# Patient Record
Sex: MALE | Race: WHITE | NOT HISPANIC OR LATINO
[De-identification: names, ages, dates, MRNs, and addresses within clinical notes are randomized per-mention and may not be internally consistent; named-entity substitution may affect disease eponyms.]

---

## 2019-01-29 PROBLEM — Z00.00 ENCOUNTER FOR PREVENTIVE HEALTH EXAMINATION: Status: ACTIVE | Noted: 2019-01-29

## 2021-03-18 ENCOUNTER — APPOINTMENT (OUTPATIENT)
Dept: ORTHOPEDIC SURGERY | Facility: CLINIC | Age: 61
End: 2021-03-18
Payer: COMMERCIAL

## 2021-03-18 PROCEDURE — 76882 US LMTD JT/FCL EVL NVASC XTR: CPT | Mod: 59,LT

## 2021-03-18 PROCEDURE — 99204 OFFICE O/P NEW MOD 45 MIN: CPT | Mod: 25

## 2021-03-18 PROCEDURE — 99072 ADDL SUPL MATRL&STAF TM PHE: CPT

## 2021-03-18 NOTE — HISTORY OF PRESENT ILLNESS
[de-identified] : YMUIKO OWEN is a 60 year male presenting to the office complaining of bilateral knee pain. He  presents to the office ambulating with a wheelchair. Patient is currently a resident at [facility].  Patient reports pain pain began on 02/06/2021 after falling down the stairs. Patient reports tripping, falling down the stairs landing with both knees flexed underneath him. He went to [facility] where he had xrays and a CT scan reportedly negative for acute fracture/dislocation. Patient went to the rehabilitation facility due to inability to ambulate. Patient states he has minimal pain overall but is unable to extend either of his legs. Patient notes he has been working with physical therapist 7 days a week for the past 6 weeks noting minimal improvements in function.   Patient reports instability of the bilateral legs. Patient is taking Tylenol for pain relief with moderate relief in symptoms. Patient denies any other complaints at this time.  Patient has a pacemaker ICD place in ~ 2013.

## 2021-03-18 NOTE — PHYSICAL EXAM
[de-identified] : Right Lower Extremity\par o Knee :\par ¦ Inspection/Palpation : no tenderness to palpation, no swelling, palpable defect distal quadriceps tendon\par ¦ Range of Motion : PROM 0 - 120 degrees, no crepitus\par ¦ Stability : no valgus or varus instability present on provocative testing, Lachman’s Test (-)\par ¦ Strength : unable to actively extend the knee. \par o Muscle Bulk : normal muscle bulk present\par o Skin : no erythema, no ecchymosis\par o Sensation : sensation to pin intact\par o Vascular Exam : no edema, no cyanosis, dorsalis pedis artery pulse 2+, posterior tibial artery pulse 2+\par \par Left Lower Extremity\par o Knee :\par ¦ Inspection/Palpation : no tenderness to palpation, no swelling, large palpable defect distal quadriceps tendon\par ¦ Range of Motion : PROM 0 -120 degrees, no crepitus\par ¦ Stability : no valgus or varus instability present on provocative testing, Lachman’s Test (-)\par ¦ Strength : unable to actively extend the knee. \par o Muscle Bulk : normal muscle bulk present\par o Skin : no erythema, no ecchymosis\par o Sensation : sensation to pin intact\par o Vascular Exam : no edema, no cyanosis, dorsalis pedis artery pulse 2+, posterior tibial artery pulse 2+  [de-identified] : A partial diagnostic ultrasound of the bilateral quadriceps tendons was performed which was significant for a visible defect in bilateral distal  quadriceps tendons. \par \par \par Patient comes to today's visit with outside imaging already performed. I reviewed the images in detail with the patient and discussed the findings as highlighted below.\par \par o Bilateral knee Xrays taken on 03/16/2021 at [facility]:  \par ¦ moderate to advanced bilateral knee osteoarthritis \par ¦ bilateral patellar baja. \par

## 2021-03-18 NOTE — DISCUSSION/SUMMARY
[de-identified] : The underlying pathophysiology was reviewed in great detail with the patient as well as the various treatment options, including ice, analgesics, NSAIDs, Physical therapy, steroid injections, surgical intervention \par \par A partial diagnostic ultrasound of the bilateral quadriceps tendons was performed today.\par \par The patient wishes to proceed with SURGICAL INTERVENTION at this time. The risks and benefits of a BILATERAL DISTAL QUADRICEPS TENDON REPAIR were discussed in great detail today, including but not limited to bleeding, infection, nerve injury, DVT, allergy to the anesthetic, re-tear, persistent pain, stiffness, scarring, swelling or deformity.\par \par Pre-surgical testing (PST) laboratory tests were ordered today. \par \par Activity modifications and restrictions were discussed.\par \par All questions were answered, all alternatives discussed and the patient is in complete agreement with that plan. Follow-up appointment as instructed. Any issues and the patient will call or come in sooner. \par \par

## 2021-03-19 ENCOUNTER — APPOINTMENT (OUTPATIENT)
Dept: DISASTER EMERGENCY | Facility: CLINIC | Age: 61
End: 2021-03-19

## 2021-03-21 ENCOUNTER — TRANSCRIPTION ENCOUNTER (OUTPATIENT)
Age: 61
End: 2021-03-21

## 2021-03-22 ENCOUNTER — APPOINTMENT (OUTPATIENT)
Dept: ORTHOPEDIC SURGERY | Facility: HOSPITAL | Age: 61
End: 2021-03-22

## 2021-03-22 ENCOUNTER — INPATIENT (INPATIENT)
Facility: HOSPITAL | Age: 61
LOS: 10 days | Discharge: NURSING, REHAB OR LONG-TERM CARE | DRG: 500 | End: 2021-04-02
Attending: STUDENT IN AN ORGANIZED HEALTH CARE EDUCATION/TRAINING PROGRAM | Admitting: ORTHOPAEDIC SURGERY
Payer: COMMERCIAL

## 2021-03-22 VITALS
WEIGHT: 281.09 LBS | HEART RATE: 60 BPM | TEMPERATURE: 97 F | HEIGHT: 69 IN | SYSTOLIC BLOOD PRESSURE: 146 MMHG | RESPIRATION RATE: 17 BRPM | OXYGEN SATURATION: 96 % | DIASTOLIC BLOOD PRESSURE: 80 MMHG

## 2021-03-22 DIAGNOSIS — I48.0 PAROXYSMAL ATRIAL FIBRILLATION: ICD-10-CM

## 2021-03-22 DIAGNOSIS — S76.119S: ICD-10-CM

## 2021-03-22 DIAGNOSIS — I42.9 CARDIOMYOPATHY, UNSPECIFIED: ICD-10-CM

## 2021-03-22 DIAGNOSIS — I50.32 CHRONIC DIASTOLIC (CONGESTIVE) HEART FAILURE: ICD-10-CM

## 2021-03-22 DIAGNOSIS — Z95.810 PRESENCE OF AUTOMATIC (IMPLANTABLE) CARDIAC DEFIBRILLATOR: Chronic | ICD-10-CM

## 2021-03-22 DIAGNOSIS — Z90.49 ACQUIRED ABSENCE OF OTHER SPECIFIED PARTS OF DIGESTIVE TRACT: Chronic | ICD-10-CM

## 2021-03-22 DIAGNOSIS — I10 ESSENTIAL (PRIMARY) HYPERTENSION: ICD-10-CM

## 2021-03-22 DIAGNOSIS — N40.0 BENIGN PROSTATIC HYPERPLASIA WITHOUT LOWER URINARY TRACT SYMPTOMS: ICD-10-CM

## 2021-03-22 DIAGNOSIS — S76.119A STRAIN OF UNSPECIFIED QUADRICEPS MUSCLE, FASCIA AND TENDON, INITIAL ENCOUNTER: ICD-10-CM

## 2021-03-22 DIAGNOSIS — G47.33 OBSTRUCTIVE SLEEP APNEA (ADULT) (PEDIATRIC): ICD-10-CM

## 2021-03-22 DIAGNOSIS — Z95.5 PRESENCE OF CORONARY ANGIOPLASTY IMPLANT AND GRAFT: Chronic | ICD-10-CM

## 2021-03-22 DIAGNOSIS — I25.10 ATHEROSCLEROTIC HEART DISEASE OF NATIVE CORONARY ARTERY WITHOUT ANGINA PECTORIS: ICD-10-CM

## 2021-03-22 DIAGNOSIS — I82.409 ACUTE EMBOLISM AND THROMBOSIS OF UNSPECIFIED DEEP VEINS OF UNSPECIFIED LOWER EXTREMITY: Chronic | ICD-10-CM

## 2021-03-22 DIAGNOSIS — E66.01 MORBID (SEVERE) OBESITY DUE TO EXCESS CALORIES: ICD-10-CM

## 2021-03-22 LAB — SARS-COV-2 N GENE NPH QL NAA+PROBE: NOT DETECTED

## 2021-03-22 PROCEDURE — 99222 1ST HOSP IP/OBS MODERATE 55: CPT

## 2021-03-22 RX ORDER — HYDROMORPHONE HYDROCHLORIDE 2 MG/ML
0.5 INJECTION INTRAMUSCULAR; INTRAVENOUS; SUBCUTANEOUS
Refills: 0 | Status: DISCONTINUED | OUTPATIENT
Start: 2021-03-22 | End: 2021-03-29

## 2021-03-22 RX ORDER — HYDROMORPHONE HYDROCHLORIDE 2 MG/ML
1 INJECTION INTRAMUSCULAR; INTRAVENOUS; SUBCUTANEOUS
Refills: 0 | Status: DISCONTINUED | OUTPATIENT
Start: 2021-03-22 | End: 2021-03-22

## 2021-03-22 RX ORDER — SACUBITRIL AND VALSARTAN 24; 26 MG/1; MG/1
1 TABLET, FILM COATED ORAL
Refills: 0 | Status: DISCONTINUED | OUTPATIENT
Start: 2021-03-22 | End: 2021-04-02

## 2021-03-22 RX ORDER — ACETAMINOPHEN 500 MG
1000 TABLET ORAL ONCE
Refills: 0 | Status: COMPLETED | OUTPATIENT
Start: 2021-03-23 | End: 2021-03-23

## 2021-03-22 RX ORDER — ACETAMINOPHEN 500 MG
1000 TABLET ORAL ONCE
Refills: 0 | Status: COMPLETED | OUTPATIENT
Start: 2021-03-22 | End: 2021-03-22

## 2021-03-22 RX ORDER — AMIODARONE HYDROCHLORIDE 400 MG/1
200 TABLET ORAL DAILY
Refills: 0 | Status: DISCONTINUED | OUTPATIENT
Start: 2021-03-22 | End: 2021-04-02

## 2021-03-22 RX ORDER — OXYCODONE HYDROCHLORIDE 5 MG/1
5 TABLET ORAL
Refills: 0 | Status: DISCONTINUED | OUTPATIENT
Start: 2021-03-22 | End: 2021-03-22

## 2021-03-22 RX ORDER — APIXABAN 2.5 MG/1
5 TABLET, FILM COATED ORAL
Refills: 0 | Status: DISCONTINUED | OUTPATIENT
Start: 2021-03-24 | End: 2021-04-02

## 2021-03-22 RX ORDER — LANOLIN ALCOHOL/MO/W.PET/CERES
3 CREAM (GRAM) TOPICAL AT BEDTIME
Refills: 0 | Status: DISCONTINUED | OUTPATIENT
Start: 2021-03-22 | End: 2021-04-02

## 2021-03-22 RX ORDER — IPRATROPIUM/ALBUTEROL SULFATE 18-103MCG
3 AEROSOL WITH ADAPTER (GRAM) INHALATION EVERY 6 HOURS
Refills: 0 | Status: DISCONTINUED | OUTPATIENT
Start: 2021-03-22 | End: 2021-04-02

## 2021-03-22 RX ORDER — ACETAMINOPHEN 500 MG
975 TABLET ORAL EVERY 8 HOURS
Refills: 0 | Status: DISCONTINUED | OUTPATIENT
Start: 2021-03-24 | End: 2021-04-02

## 2021-03-22 RX ORDER — POLYETHYLENE GLYCOL 3350 17 G/17G
17 POWDER, FOR SOLUTION ORAL AT BEDTIME
Refills: 0 | Status: DISCONTINUED | OUTPATIENT
Start: 2021-03-22 | End: 2021-04-02

## 2021-03-22 RX ORDER — APIXABAN 2.5 MG/1
2.5 TABLET, FILM COATED ORAL EVERY 12 HOURS
Refills: 0 | Status: COMPLETED | OUTPATIENT
Start: 2021-03-23 | End: 2021-03-23

## 2021-03-22 RX ORDER — TAMSULOSIN HYDROCHLORIDE 0.4 MG/1
0.4 CAPSULE ORAL AT BEDTIME
Refills: 0 | Status: DISCONTINUED | OUTPATIENT
Start: 2021-03-22 | End: 2021-04-02

## 2021-03-22 RX ORDER — SODIUM CHLORIDE 9 MG/ML
1000 INJECTION, SOLUTION INTRAVENOUS
Refills: 0 | Status: DISCONTINUED | OUTPATIENT
Start: 2021-03-22 | End: 2021-03-22

## 2021-03-22 RX ORDER — SENNA PLUS 8.6 MG/1
2 TABLET ORAL AT BEDTIME
Refills: 0 | Status: DISCONTINUED | OUTPATIENT
Start: 2021-03-22 | End: 2021-04-02

## 2021-03-22 RX ORDER — IPRATROPIUM/ALBUTEROL SULFATE 18-103MCG
3 AEROSOL WITH ADAPTER (GRAM) INHALATION ONCE
Refills: 0 | Status: COMPLETED | OUTPATIENT
Start: 2021-03-22 | End: 2021-03-22

## 2021-03-22 RX ORDER — OXYBUTYNIN CHLORIDE 5 MG
10 TABLET ORAL DAILY
Refills: 0 | Status: DISCONTINUED | OUTPATIENT
Start: 2021-03-22 | End: 2021-04-02

## 2021-03-22 RX ORDER — ONDANSETRON 8 MG/1
4 TABLET, FILM COATED ORAL ONCE
Refills: 0 | Status: DISCONTINUED | OUTPATIENT
Start: 2021-03-22 | End: 2021-04-02

## 2021-03-22 RX ORDER — INFLUENZA VIRUS VACCINE 15; 15; 15; 15 UG/.5ML; UG/.5ML; UG/.5ML; UG/.5ML
0.5 SUSPENSION INTRAMUSCULAR ONCE
Refills: 0 | Status: DISCONTINUED | OUTPATIENT
Start: 2021-03-22 | End: 2021-04-02

## 2021-03-22 RX ORDER — KETOROLAC TROMETHAMINE 30 MG/ML
30 SYRINGE (ML) INJECTION ONCE
Refills: 0 | Status: DISCONTINUED | OUTPATIENT
Start: 2021-03-22 | End: 2021-03-22

## 2021-03-22 RX ORDER — CEFAZOLIN SODIUM 1 G
3000 VIAL (EA) INJECTION EVERY 8 HOURS
Refills: 0 | Status: COMPLETED | OUTPATIENT
Start: 2021-03-22 | End: 2021-03-23

## 2021-03-22 RX ORDER — FINASTERIDE 5 MG/1
5 TABLET, FILM COATED ORAL DAILY
Refills: 0 | Status: DISCONTINUED | OUTPATIENT
Start: 2021-03-22 | End: 2021-04-02

## 2021-03-22 RX ORDER — OXYCODONE HYDROCHLORIDE 5 MG/1
10 TABLET ORAL
Refills: 0 | Status: DISCONTINUED | OUTPATIENT
Start: 2021-03-22 | End: 2021-03-29

## 2021-03-22 RX ORDER — HYDROMORPHONE HYDROCHLORIDE 2 MG/ML
0.5 INJECTION INTRAMUSCULAR; INTRAVENOUS; SUBCUTANEOUS
Refills: 0 | Status: DISCONTINUED | OUTPATIENT
Start: 2021-03-22 | End: 2021-03-22

## 2021-03-22 RX ORDER — APIXABAN 2.5 MG/1
5 TABLET, FILM COATED ORAL EVERY 12 HOURS
Refills: 0 | Status: DISCONTINUED | OUTPATIENT
Start: 2021-03-22 | End: 2021-03-22

## 2021-03-22 RX ORDER — PANTOPRAZOLE SODIUM 20 MG/1
40 TABLET, DELAYED RELEASE ORAL
Refills: 0 | Status: DISCONTINUED | OUTPATIENT
Start: 2021-03-22 | End: 2021-04-02

## 2021-03-22 RX ORDER — ATORVASTATIN CALCIUM 80 MG/1
40 TABLET, FILM COATED ORAL AT BEDTIME
Refills: 0 | Status: DISCONTINUED | OUTPATIENT
Start: 2021-03-22 | End: 2021-04-02

## 2021-03-22 RX ORDER — CARVEDILOL PHOSPHATE 80 MG/1
25 CAPSULE, EXTENDED RELEASE ORAL
Refills: 0 | Status: DISCONTINUED | OUTPATIENT
Start: 2021-03-22 | End: 2021-04-02

## 2021-03-22 RX ORDER — ONDANSETRON 8 MG/1
4 TABLET, FILM COATED ORAL ONCE
Refills: 0 | Status: DISCONTINUED | OUTPATIENT
Start: 2021-03-22 | End: 2021-03-22

## 2021-03-22 RX ADMIN — HYDROMORPHONE HYDROCHLORIDE 0.5 MILLIGRAM(S): 2 INJECTION INTRAMUSCULAR; INTRAVENOUS; SUBCUTANEOUS at 19:56

## 2021-03-22 RX ADMIN — HYDROMORPHONE HYDROCHLORIDE 0.5 MILLIGRAM(S): 2 INJECTION INTRAMUSCULAR; INTRAVENOUS; SUBCUTANEOUS at 19:08

## 2021-03-22 RX ADMIN — POLYETHYLENE GLYCOL 3350 17 GRAM(S): 17 POWDER, FOR SOLUTION ORAL at 22:21

## 2021-03-22 RX ADMIN — HYDROMORPHONE HYDROCHLORIDE 0.5 MILLIGRAM(S): 2 INJECTION INTRAMUSCULAR; INTRAVENOUS; SUBCUTANEOUS at 22:19

## 2021-03-22 RX ADMIN — Medication 3 MILLILITER(S): at 20:18

## 2021-03-22 RX ADMIN — HYDROMORPHONE HYDROCHLORIDE 0.5 MILLIGRAM(S): 2 INJECTION INTRAMUSCULAR; INTRAVENOUS; SUBCUTANEOUS at 22:39

## 2021-03-22 RX ADMIN — Medication 200 MILLIGRAM(S): at 23:49

## 2021-03-22 RX ADMIN — ATORVASTATIN CALCIUM 40 MILLIGRAM(S): 80 TABLET, FILM COATED ORAL at 22:21

## 2021-03-22 RX ADMIN — Medication 3 MILLIGRAM(S): at 22:22

## 2021-03-22 RX ADMIN — HYDROMORPHONE HYDROCHLORIDE 0.5 MILLIGRAM(S): 2 INJECTION INTRAMUSCULAR; INTRAVENOUS; SUBCUTANEOUS at 19:06

## 2021-03-22 RX ADMIN — SENNA PLUS 2 TABLET(S): 8.6 TABLET ORAL at 22:21

## 2021-03-22 RX ADMIN — HYDROMORPHONE HYDROCHLORIDE 0.5 MILLIGRAM(S): 2 INJECTION INTRAMUSCULAR; INTRAVENOUS; SUBCUTANEOUS at 19:16

## 2021-03-22 RX ADMIN — TAMSULOSIN HYDROCHLORIDE 0.4 MILLIGRAM(S): 0.4 CAPSULE ORAL at 22:21

## 2021-03-22 RX ADMIN — HYDROMORPHONE HYDROCHLORIDE 0.5 MILLIGRAM(S): 2 INJECTION INTRAMUSCULAR; INTRAVENOUS; SUBCUTANEOUS at 18:58

## 2021-03-22 RX ADMIN — Medication 400 MILLIGRAM(S): at 23:49

## 2021-03-22 NOTE — CONSULT NOTE ADULT - ASSESSMENT
Mr Blankenship is a 60 year old morbidly obese male with  HTN, Afib on Eliquis, Cardiomyopathy (latest EF 53% - improved from 25%), s/p AICD, ERICK (cannot tolerate CPAP-noncompliant), HLD, BPH, arthritis admitted for repair of bilateral quadricep tendon rupture.  He was initially at [facility], s/p fall from 02/04/2021, no fractures noted so was d/margaret to rehab facility.  However, even after more than 2 weeks, gait has not improved, still unable to ambulate.  He was seen by ortho, further testing, found to have bilateral quadriceps rupture, he underwent repair this afternoon.  Post op, had episode of wheezing, relieved with Duoneb.   s/p repair of quadricep tendon rupture, bilat  post op bronchospasm, resolved, does not appear to be in CHF  Multiple chronic medical problems as above, but currently stable    Continue O2 supp - switch to nasal cannula 4L for now, while asleep - pt is intolerant of CPAP  Cont current meds: Amiodarone, Carvedilol, Entresto, Eliquis, Atorvastatin, Finasteride, Flomax, Oxybutinin  Duoneb prn, Analgesics prn   Incentive spirometry Mr Blankenship is a 60 year old morbidly obese male with  HTN, Afib on Eliquis, Cardiomyopathy (latest EF 53% - improved from 25%), s/p AICD, ERICK (cannot tolerate CPAP-noncompliant), HLD, BPH, arthritis admitted for repair of bilateral quadricep tendon rupture.  He was initially at [facility], s/p fall from 02/04/2021, no fractures noted so was d/margaret to rehab facility.  However, even after more than 2 weeks, gait has not improved, still unable to ambulate.  He was seen by ortho, further testing, found to have bilateral quadriceps rupture, he underwent repair this afternoon.  Post op, had episode of wheezing, relieved with Duoneb.   s/p repair of quadricep tendon rupture, bilat  post op bronchospasm, resolved, does not appear to be in CHF  Multiple chronic medical problems as above, but currently stable    Continue O2 supp - switch to nasal cannula 4L for now, while asleep - pt is intolerant of CPAP  Cont current meds: Amiodarone, Carvedilol, Entresto, Eliquis, Atorvastatin, Finasteride, Flomax, Oxybutinin  Duoneb prn, Analgesics prn   Incentive spirometry  PT  ffup labs    IMPROVE VTE Individual Risk Assessment  RISK                                                                Points  [x  ] Previous VTE                                                  3  [  ] Thrombophilia                                               2  [  ] Lower limb paralysis                                      2        (unable to hold up >15 seconds)    [  ] Current Cancer                                              2         (within 6 months)  [ x ] Immobilization > 24 hrs                                1  [  ] ICU/CCU stay > 24 hours                              1  [x  ] Age > 60                                                      1  IMPROVE VTE Score _5_  IMPROVE Score 0-1: Low Risk, No VTE prophylaxis required for most patients, encourage ambulation.   IMPROVE Score 2-3: At risk, pharmacologic VTE prophylaxis is indicated for most patients (in the absence of a contraindication)  *Pt is already on Eliquis  IMPROVE Score > or = 4: High Risk, pharmacologic VTE prophylaxis is indicated for most patients (in the absence of a contraindication) Mr Blankenship is a 60 year old morbidly obese male with  HTN, Afib on Eliquis, Cardiomyopathy (latest EF 53% - improved from 25%), s/p AICD, ERICK (cannot tolerate CPAP-noncompliant), HLD, BPH, arthritis admitted for repair of bilateral quadricep tendon rupture.  He was initially at [facility], s/p fall from 02/04/2021, no fractures noted so was d/margaret to rehab facility.  However, even after more than 2 weeks, gait has not improved, still unable to ambulate.  He was seen by ortho, further testing, found to have bilateral quadriceps rupture, he underwent repair this afternoon.  Post op, had episode of wheezing, relieved with Duoneb.   s/p repair of quadricep tendon rupture, bilat  post op bronchospasm, resolved, does not appear to be in CHF  Multiple chronic medical problems as above, but currently stable    Continue O2 supp - switch to nasal cannula 4L for now, while asleep - pt is intolerant of CPAP  Cont current meds: Amiodarone, Carvedilol, Entresto, Eliquis, Atorvastatin, Finasteride, Flomax, Oxybutinin  Duoneb prn, Analgesics prn   Incentive spirometry  PT  ffup labs    IMPROVE VTE Individual Risk Assessment  RISK                                                                Points  [x  ] Previous VTE                                                  3  [  ] Thrombophilia                                               2  [  ] Lower limb paralysis                                      2        (unable to hold up >15 seconds)    [  ] Current Cancer                                              2         (within 6 months)  [ x ] Immobilization > 24 hrs                                1  [  ] ICU/CCU stay > 24 hours                              1  [x  ] Age > 60                                                      1  IMPROVE VTE Score _5_  IMPROVE Score 0-1: Low Risk, No VTE prophylaxis required for most patients, encourage ambulation.   IMPROVE Score 2-3: At risk, pharmacologic VTE prophylaxis is indicated for most patients (in the absence of a contraindication)  IMPROVE Score > or = 4: High Risk, pharmacologic VTE prophylaxis is indicated for most patients (in the absence of a contraindication)  *Pt is already on Eliquis

## 2021-03-22 NOTE — BRIEF OPERATIVE NOTE - NSICDXBRIEFPREOP_GEN_ALL_CORE_FT
PRE-OP DIAGNOSIS:  Rupture of quadriceps tendon, unspecified laterality, sequela 22-Mar-2021 19:09:47 Bilateral Oscar Burger

## 2021-03-22 NOTE — BRIEF OPERATIVE NOTE - NSICDXBRIEFPOSTOP_GEN_ALL_CORE_FT
POST-OP DIAGNOSIS:  Traumatic rupture of quadriceps tendon, sequela 22-Mar-2021 19:10:28 Bilateral Oscar Burger

## 2021-03-22 NOTE — ASU PATIENT PROFILE, ADULT - PMH
BPH (benign prostatic hyperplasia)    CAD (coronary artery disease)    Cardiomyopathy    Chronic diastolic congestive heart failure    Effusion of both knee joints    HLD (hyperlipidemia)    HTN (hypertension)    Morbid obesity    ERICK (obstructive sleep apnea)    Paroxysmal A-fib    Syncope

## 2021-03-22 NOTE — CONSULT NOTE ADULT - SUBJECTIVE AND OBJECTIVE BOX
Patient gives hx of falling down the stairs on Feb 4.   He was taken  to [facility] for evaluation ,    He was sent home but patient unable to    ambulate .   He was admitted to a rehab facility for therapy.   The patient was not improving with therapy and was sent for orthopedic evaluation.  He underwent testing and was found to have bilateral quadriceps rupture.   Patient scheduled for bilateral repair this afternoon and will then return   to rehab center.               Mr Blankenship is a 60 year old morbidly obese male with  HTN, Afib on Eliquis, Cardiomyopathy (latest EF 53% - improved from 25%), s/p AICD, ERICK (cannot tolerate CPAP-noncompliant), HLD, BPH, arthritis admitted for repair of bilateral quadricep tendon rupture.  He was initially at [facility], s/p fall from 02/04/2021, no fractures noted so was d/margaret to rehab facility.  However, even after more than 2 weeks, gait has not improved, still unable to ambulate.  He was seen by ortho, further testing, found to have bilateral quadriceps rupture, he underwent repair this afternoon.    Post op, pt with episode of wheezing, received Duoneb x 1, with relief.  He denies chest pain, cough, dyspnea.  He is responsive but drowsy.  Pt states he is unable to tolerate CPAP, even with a face or nasal mask.  He is a non smoker.                Mr Blankenship is a 60 year old morbidly obese male with  HTN, Afib on Eliquis, Cardiomyopathy (latest EF 53% - improved from 25%), s/p AICD, ERICK (cannot tolerate CPAP-noncompliant), HLD, BPH, arthritis admitted for repair of bilateral quadricep tendon rupture.  He was initially at [facility], s/p fall from 02/04/2021, no fractures noted so was d/margaret to rehab facility.  However, even after more than 2 weeks, gait has not improved, still unable to ambulate.  He was seen by ortho, further testing, found to have bilateral quadriceps rupture, he underwent repair this afternoon.    Post op, pt with episode of wheezing, received Duoneb x 1, with relief.  He was placed on a face mask 30% and O2 sat is 100%.  He denies chest pain, cough, dyspnea.  He is responsive but drowsy.  Pt states he is unable to tolerate CPAP, even with a face or nasal mask.  He is a non smoker, +occ/social etoh.    Home Medications:  amiodarone 200 mg oral tablet: 1 tab(s) orally once a day (22 Mar 2021 20:51)  atorvastatin 40 mg oral tablet: 1 tab(s) orally once a day (22 Mar 2021 20:51)  carvedilol 25 mg oral tablet: 1 tab(s) orally 2 times a day (22 Mar 2021 20:51)  Eliquis 5 mg oral tablet: 1 tab(s) orally 2 times a day (22 Mar 2021 20:51)  Entresto 49 mg-51 mg oral tablet: 1 tab(s) orally 2 times a day (22 Mar 2021 20:51)  finasteride 5 mg oral tablet: 1 tab(s) orally once a day (22 Mar 2021 20:51)  Flomax 0.4 mg oral capsule: 1 cap(s) orally once a day (22 Mar 2021 20:51)  Melatonin 3 mg oral tablet: 1 tab(s) orally once a day (at bedtime) (22 Mar 2021 20:51)  oxybutynin 10 mg/24 hr oral tablet, extended release: 1 tab(s) orally once a day (22 Mar 2021 20:51)  traMADol 50 mg oral tablet: orally every 6 hours, As Needed (22 Mar 2021 20:51)

## 2021-03-22 NOTE — ASU PREOP CHECKLIST - HOW ADMINISTERED
carvedilol/Self Administrated Complex Repair And Flap Additional Text (Will Appearing After The Standard Complex Repair Text): The complex repair was not sufficient to completely close the primary defect. The remaining additional defect was repaired with the flap mentioned below.

## 2021-03-22 NOTE — H&P ADULT - NSICDXPASTSURGICALHX_GEN_ALL_CORE_FT
PAST SURGICAL HISTORY:  Acute DVT (deep venous thrombosis)     Biventricular ICD (implantable cardioverter-defibrillator) in place     Coronary stent patent     History of appendectomy

## 2021-03-22 NOTE — H&P ADULT - NSHPPHYSICALEXAM_GEN_ALL_CORE
Gen:  obese gentleman , Alert and oriented X 3   Good hygiene  Neck:  No JVD   Lungs:  CTA   Cor:  S1S2  no murmur appreciated   Abd:  soft, obese, +BS   Ext:  + Bilateral quadriceps rupture Right > Left           Decreased ROM - patient unable to lift legs           calves soft, good pulses

## 2021-03-22 NOTE — PATIENT PROFILE ADULT - VISION (WITH CORRECTIVE LENSES IF THE PATIENT USUALLY WEARS THEM):
Eyeglasses/Partially impaired: cannot see medication labels or newsprint, but can see obstacles in path, and the surrounding layout; can count fingers at arm's length

## 2021-03-22 NOTE — PATIENT PROFILE ADULT - NSTRANSFEREYEGLASSESPAIRS_GEN_A_NUR
Per patient, "Still in a clear bag in pre-operative room." Will f/u in AM to retrieve eyeglasses./1 pair

## 2021-03-22 NOTE — H&P ADULT - HISTORY OF PRESENT ILLNESS
Patient gives hx of falling down the stairs on Feb 4.   He was taken  to [facility] for evaluation ,    He was sent home but patient unable to    ambulate .   He was admitted to a rehab facility for therapy.   The patient was not improving with therapy and was sent for orthopedic evaluation.  He underwent testing and was found to have bilateral quadriceps rupture.   Patient scheduled for bilateral repair this afternoon and will then return   to rehab center.

## 2021-03-22 NOTE — H&P ADULT - ASSESSMENT
60 year old male obese, ERICK, cardiomyapathy, , CAD., CHF, Cardiac stents, afib, htn, hld, bph presents for repair of bilateral quadriceps rupture .

## 2021-03-22 NOTE — H&P ADULT - NSICDXPASTMEDICALHX_GEN_ALL_CORE_FT
PAST MEDICAL HISTORY:  BPH (benign prostatic hyperplasia)     CAD (coronary artery disease)     Cardiomyopathy     Chronic diastolic congestive heart failure     Effusion of both knee joints     HLD (hyperlipidemia)     HTN (hypertension)     Morbid obesity     ERICK (obstructive sleep apnea)     Paroxysmal A-fib     Syncope

## 2021-03-22 NOTE — ASU PATIENT PROFILE, ADULT - PSH
Biventricular ICD (implantable cardioverter-defibrillator) in place    Coronary stent patent     Acute DVT (deep venous thrombosis)    Biventricular ICD (implantable cardioverter-defibrillator) in place    Coronary stent patent    History of appendectomy

## 2021-03-22 NOTE — H&P ADULT - NSHPREVIEWOFSYSTEMS_GEN_ALL_CORE
ERICK  hx of CHF  paroxysmal Atrial Fibrillation   htn  bph  CAD with 6 coronary  stents  (pacemaker /difibrillator )  cardiomyopathy   mild arthritis   bilateral ruptured quadriceps

## 2021-03-23 LAB
ANION GAP SERPL CALC-SCNC: 11 MMOL/L — SIGNIFICANT CHANGE UP (ref 5–17)
BUN SERPL-MCNC: 20 MG/DL — SIGNIFICANT CHANGE UP (ref 7–23)
CALCIUM SERPL-MCNC: 9.2 MG/DL — SIGNIFICANT CHANGE UP (ref 8.4–10.5)
CHLORIDE SERPL-SCNC: 105 MMOL/L — SIGNIFICANT CHANGE UP (ref 96–108)
CO2 SERPL-SCNC: 25 MMOL/L — SIGNIFICANT CHANGE UP (ref 22–31)
COVID-19 SPIKE DOMAIN AB INTERP: NEGATIVE — SIGNIFICANT CHANGE UP
COVID-19 SPIKE DOMAIN ANTIBODY RESULT: 0.4 U/ML — SIGNIFICANT CHANGE UP
CREAT SERPL-MCNC: 1.26 MG/DL — SIGNIFICANT CHANGE UP (ref 0.5–1.3)
GLUCOSE SERPL-MCNC: 155 MG/DL — HIGH (ref 70–99)
HCT VFR BLD CALC: 42 % — SIGNIFICANT CHANGE UP (ref 39–50)
HCV AB S/CO SERPL IA: 0.16 S/CO — SIGNIFICANT CHANGE UP (ref 0–0.99)
HCV AB SERPL-IMP: SIGNIFICANT CHANGE UP
HGB BLD-MCNC: 13.9 G/DL — SIGNIFICANT CHANGE UP (ref 13–17)
MCHC RBC-ENTMCNC: 32.3 PG — SIGNIFICANT CHANGE UP (ref 27–34)
MCHC RBC-ENTMCNC: 33.1 GM/DL — SIGNIFICANT CHANGE UP (ref 32–36)
MCV RBC AUTO: 97.7 FL — SIGNIFICANT CHANGE UP (ref 80–100)
NRBC # BLD: 0 /100 WBCS — SIGNIFICANT CHANGE UP (ref 0–0)
NT-PROBNP SERPL-SCNC: 437 PG/ML — HIGH (ref 0–300)
PLATELET # BLD AUTO: 241 K/UL — SIGNIFICANT CHANGE UP (ref 150–400)
POTASSIUM SERPL-MCNC: 4 MMOL/L — SIGNIFICANT CHANGE UP (ref 3.5–5.3)
POTASSIUM SERPL-SCNC: 4 MMOL/L — SIGNIFICANT CHANGE UP (ref 3.5–5.3)
RBC # BLD: 4.3 M/UL — SIGNIFICANT CHANGE UP (ref 4.2–5.8)
RBC # FLD: 13.8 % — SIGNIFICANT CHANGE UP (ref 10.3–14.5)
SARS-COV-2 IGG+IGM SERPL QL IA: 0.4 U/ML — SIGNIFICANT CHANGE UP
SARS-COV-2 IGG+IGM SERPL QL IA: NEGATIVE — SIGNIFICANT CHANGE UP
SODIUM SERPL-SCNC: 141 MMOL/L — SIGNIFICANT CHANGE UP (ref 135–145)
WBC # BLD: 11.59 K/UL — HIGH (ref 3.8–10.5)
WBC # FLD AUTO: 11.59 K/UL — HIGH (ref 3.8–10.5)

## 2021-03-23 PROCEDURE — 99233 SBSQ HOSP IP/OBS HIGH 50: CPT

## 2021-03-23 PROCEDURE — 99222 1ST HOSP IP/OBS MODERATE 55: CPT

## 2021-03-23 PROCEDURE — 71045 X-RAY EXAM CHEST 1 VIEW: CPT | Mod: 26

## 2021-03-23 RX ADMIN — Medication 3 MILLIGRAM(S): at 21:24

## 2021-03-23 RX ADMIN — Medication 1000 MILLIGRAM(S): at 00:14

## 2021-03-23 RX ADMIN — HYDROMORPHONE HYDROCHLORIDE 0.5 MILLIGRAM(S): 2 INJECTION INTRAMUSCULAR; INTRAVENOUS; SUBCUTANEOUS at 05:15

## 2021-03-23 RX ADMIN — SACUBITRIL AND VALSARTAN 1 TABLET(S): 24; 26 TABLET, FILM COATED ORAL at 17:15

## 2021-03-23 RX ADMIN — CARVEDILOL PHOSPHATE 25 MILLIGRAM(S): 80 CAPSULE, EXTENDED RELEASE ORAL at 17:16

## 2021-03-23 RX ADMIN — CARVEDILOL PHOSPHATE 25 MILLIGRAM(S): 80 CAPSULE, EXTENDED RELEASE ORAL at 05:20

## 2021-03-23 RX ADMIN — Medication 1000 MILLIGRAM(S): at 08:01

## 2021-03-23 RX ADMIN — HYDROMORPHONE HYDROCHLORIDE 0.5 MILLIGRAM(S): 2 INJECTION INTRAMUSCULAR; INTRAVENOUS; SUBCUTANEOUS at 05:25

## 2021-03-23 RX ADMIN — Medication 200 MILLIGRAM(S): at 08:51

## 2021-03-23 RX ADMIN — APIXABAN 2.5 MILLIGRAM(S): 2.5 TABLET, FILM COATED ORAL at 09:39

## 2021-03-23 RX ADMIN — OXYCODONE HYDROCHLORIDE 10 MILLIGRAM(S): 5 TABLET ORAL at 14:59

## 2021-03-23 RX ADMIN — FINASTERIDE 5 MILLIGRAM(S): 5 TABLET, FILM COATED ORAL at 12:22

## 2021-03-23 RX ADMIN — TAMSULOSIN HYDROCHLORIDE 0.4 MILLIGRAM(S): 0.4 CAPSULE ORAL at 21:24

## 2021-03-23 RX ADMIN — Medication 400 MILLIGRAM(S): at 16:47

## 2021-03-23 RX ADMIN — SACUBITRIL AND VALSARTAN 1 TABLET(S): 24; 26 TABLET, FILM COATED ORAL at 05:20

## 2021-03-23 RX ADMIN — OXYCODONE HYDROCHLORIDE 10 MILLIGRAM(S): 5 TABLET ORAL at 13:59

## 2021-03-23 RX ADMIN — HYDROMORPHONE HYDROCHLORIDE 0.5 MILLIGRAM(S): 2 INJECTION INTRAMUSCULAR; INTRAVENOUS; SUBCUTANEOUS at 23:00

## 2021-03-23 RX ADMIN — Medication 1000 MILLIGRAM(S): at 17:02

## 2021-03-23 RX ADMIN — Medication 10 MILLIGRAM(S): at 12:22

## 2021-03-23 RX ADMIN — HYDROMORPHONE HYDROCHLORIDE 0.5 MILLIGRAM(S): 2 INJECTION INTRAMUSCULAR; INTRAVENOUS; SUBCUTANEOUS at 22:37

## 2021-03-23 RX ADMIN — PANTOPRAZOLE SODIUM 40 MILLIGRAM(S): 20 TABLET, DELAYED RELEASE ORAL at 05:21

## 2021-03-23 RX ADMIN — CARVEDILOL PHOSPHATE 25 MILLIGRAM(S): 80 CAPSULE, EXTENDED RELEASE ORAL at 01:13

## 2021-03-23 RX ADMIN — OXYCODONE HYDROCHLORIDE 10 MILLIGRAM(S): 5 TABLET ORAL at 09:39

## 2021-03-23 RX ADMIN — ATORVASTATIN CALCIUM 40 MILLIGRAM(S): 80 TABLET, FILM COATED ORAL at 21:24

## 2021-03-23 RX ADMIN — OXYCODONE HYDROCHLORIDE 10 MILLIGRAM(S): 5 TABLET ORAL at 10:39

## 2021-03-23 RX ADMIN — Medication 400 MILLIGRAM(S): at 07:46

## 2021-03-23 RX ADMIN — APIXABAN 2.5 MILLIGRAM(S): 2.5 TABLET, FILM COATED ORAL at 21:24

## 2021-03-23 RX ADMIN — AMIODARONE HYDROCHLORIDE 200 MILLIGRAM(S): 400 TABLET ORAL at 05:20

## 2021-03-23 NOTE — CONSULT NOTE ADULT - SUBJECTIVE AND OBJECTIVE BOX
Chief Complaint:   Patient gives hx of falling down the stairs on Feb 4.   He was taken  to [facility] for evaluation ,    He was sent home but patient unable to    ambulate .   He was admitted to a rehab facility for therapy.   The patient was not improving with therapy and was sent for orthopedic evaluation.  He underwent testing and was found to have bilateral quadriceps rupture.   Patient scheduled for bilateral repair this afternoon and will then return to rehab center. we are asked to evaluate anticoagulation status.  patient of dr bahena.  patt.     HPI:    PMH:   Chronic diastolic congestive heart failure    BPH (benign prostatic hyperplasia)    Morbid obesity    HLD (hyperlipidemia)    Effusion of both knee joints    HTN (hypertension)    ERICK (obstructive sleep apnea)    Syncope    Cardiomyopathy    Paroxysmal A-fib    CAD (coronary artery disease)      PSH:   History of appendectomy    Acute DVT (deep venous thrombosis)    Biventricular ICD (implantable cardioverter-defibrillator) in place    Coronary stent patent      Family History:  FAMILY HISTORY:      Social History:  Smoking:  Alcohol:  Drugs:    Allergies:  No Known Allergies      Medications:  acetaminophen  IVPB .. 1000 milliGRAM(s) IV Intermittent once  albuterol/ipratropium for Nebulization 3 milliLiter(s) Nebulizer every 6 hours PRN  aMIOdarone    Tablet 200 milliGRAM(s) Oral daily  apixaban 2.5 milliGRAM(s) Oral every 12 hours  atorvastatin 40 milliGRAM(s) Oral at bedtime  bisacodyl Suppository 10 milliGRAM(s) Rectal once PRN  carvedilol 25 milliGRAM(s) Oral two times a day  finasteride 5 milliGRAM(s) Oral daily  HYDROmorphone  Injectable 0.5 milliGRAM(s) IV Push every 3 hours PRN  influenza   Vaccine 0.5 milliLiter(s) IntraMuscular once  ketorolac   Injectable 30 milliGRAM(s) IV Push once PRN  melatonin 3 milliGRAM(s) Oral at bedtime  ondansetron Injectable 4 milliGRAM(s) IV Push once  oxybutynin 10 milliGRAM(s) Oral daily  oxyCODONE    IR 10 milliGRAM(s) Oral every 3 hours PRN  oxyCODONE    IR 5 milliGRAM(s) Oral every 3 hours PRN  pantoprazole    Tablet 40 milliGRAM(s) Oral before breakfast  polyethylene glycol 3350 17 Gram(s) Oral at bedtime  sacubitril 49 mG/valsartan 51 mG 1 Tablet(s) Oral two times a day  senna 2 Tablet(s) Oral at bedtime  tamsulosin 0.4 milliGRAM(s) Oral at bedtime      REVIEW OF SYSTEMS:  CONSTITUTIONAL: No fever, weight loss, or fatigue  EYES: No eye pain, visual disturbances, or discharge  ENMT:  No difficulty hearing, tinnitus, vertigo; No sinus or throat pain  NECK: No pain or stiffness  BREASTS: No pain, masses, or nipple discharge  RESPIRATORY: No cough, wheezing, chills or hemoptysis; No shortness of breath  CARDIOVASCULAR: No chest pain, palpitations, dizziness, or leg swelling  GASTROINTESTINAL: No abdominal or epigastric pain. No nausea, vomiting, or hematemesis; No diarrhea or constipation. No melena or hematochezia.  GENITOURINARY: No dysuria, frequency, hematuria, or incontinence  NEUROLOGICAL: No headaches, memory loss, loss of strength, numbness, or tremors  SKIN: No itching, burning, rashes, or lesions   LYMPH NODES: No enlarged glands  ENDOCRINE: No heat or cold intolerance; No hair loss  MUSCULOSKELETAL: No joint pain or swelling; No muscle, back, or extremity pain  PSYCHIATRIC: No depression, anxiety, mood swings, or difficulty sleeping  HEME/LYMPH: No easy bruising, or bleeding gums  ALLERY AND IMMUNOLOGIC: No hives or eczema    Physical Exam:  T(C): 36.8 (03-23-21 @ 08:06), Max: 37.1 (03-22-21 @ 20:54)  HR: 77 (03-23-21 @ 08:06) (70 - 88)  BP: 130/72 (03-23-21 @ 08:06) (130/72 - 186/87)  RR: 14 (03-23-21 @ 08:06) (14 - 18)  SpO2: 94% (03-23-21 @ 08:06) (94% - 99%)  Wt(kg): --    GENERAL: NAD, well-groomed, well-developed overweight  HEAD:  Atraumatic, Normocephalic  EYES: EOMI, conjunctiva and sclera clear  ENT: Moist mucous membranes,  NECK: Supple, No JVD, no bruits  CHEST/LUNG: Clear to percussion bilaterally; No rales, rhonchi, wheezing, or rubs  HEART: Regular rate and rhythm; No murmurs, rubs, or gallops PMI non displaced.  ABDOMEN: Soft, Nontender, Nondistended; Bowel sounds present  EXTREMITIES:  2+ Peripheral Pulses, No clubbing, cyanosis, or edema  SKIN: No rashes or lesions  NERVOUS SYSTEM:  Cranial Nerves II-XII intact     Cardiovascular Diagnostic Testing:  ECG:    evae cam      ECHO:        Labs:                        13.9   11.59 )-----------( 241      ( 23 Mar 2021 06:00 )             42.0     03-23    141  |  105  |  20  ----------------------------<  155<H>  4.0   |  25  |  1.26    Ca    9.2      23 Mar 2021 06:00      Serum Pro-Brain Natriuretic Peptide: 437 pg/mL (03-23 @ 06:00)      Imaging:    < from: Xray Chest 1 View- PORTABLE-Routine (Xray Chest 1 View- PORTABLE-Routine in AM.) (03.23.21 @ 09:47) >  ETATION:  Portable chest x-ray    Clinical Indication: History of cardiomyopathy. Rule out CHF    Comparison: None    Portable chest x-ray is acquired for evaluation.    Impression: No evidence for acute pulmonary infiltrate, pleural effusion, or pneumothorax.    The trachea is midline.    The cardiac silhouette is within normal limits.    No pulmonary vascular congestion.    Left cardiac device is present. Another electronic device projecting over the left lung base.        JESU BUTT MD; Attending Radiologist  This document has been electronically signed. Mar 23 2021 10:33AM    < end of copied text >              discussion    ischemic cardiomyopathy /chronic diastolic CHF/prior cardiac stents  review [facility] records dr bahena/  previously on asa 81 mg according to patient report.      morbid obesity/ history of Afib with peripheral  embolization  previously on Eliquis 5 bid according to patient report

## 2021-03-23 NOTE — OCCUPATIONAL THERAPY INITIAL EVALUATION ADULT - ADDITIONAL COMMENTS
As per pt, prior to 2/21 pt was independent in all ADLs/transfers however, required increased time and effort due to leg pain and poor endurance. Pt reports after fall with bilateral quad rupture, he required assistance for all transfers and ADLs in [facility]. Pt reports he was transferring in [facility] with a slide board

## 2021-03-23 NOTE — PROGRESS NOTE ADULT - ASSESSMENT
1 year old morbidly obese male with  HTN, Afib on Eliquis, Cardiomyopathy (latest EF 53% - improved from 25%), s/p AICD, ERICK (cannot tolerate CPAP-noncompliant), HLD, BPH, arthritis, s/p fall 2/4/2021, discharged to rehab. After 2 weeks of rehab without improvement in gait, further work up was performed and he was found to have bilateral quadriceps tendon rupture. S/p repair on 3/22 with Orthopaedist Dr. Caro.    # acute hypoxic respiratory failure  # ERICK  - suspect atelectasis.   - CXR without acute abnormalities.  - Pt denies smoking hx.   - BNP mild elevation / ULN. With hx of HF, may consider gentle diuresis.   - recommend out of bed more than in bed  - incentive spirometry  - duonebs PRN    # Diastolic heart failure s/p AICD  # A fib  # HTN  appears euvolemic  - cont amiodarone, eliquis, entresto        # BPH  - cont tamsulosin and finasteride    # B/l Quadriceps tendon rupture  - f/u recs from Orthopaedics  - PT  - DVT ppx  - bowel regimen  - pain control         1 year old morbidly obese male with  HTN, Afib on Eliquis, Cardiomyopathy (latest EF 53% - improved from 25%), s/p AICD, ERICK (cannot tolerate CPAP-noncompliant), HLD, BPH, arthritis, s/p fall 2/4/2021, discharged to rehab. After 2 weeks of rehab without improvement in gait, further work up was performed and he was found to have bilateral quadriceps tendon rupture. S/p repair on 3/22 with Orthopaedist Dr. Caro.    # acute hypoxic respiratory failure  # ERICK  - suspect atelectasis.   - CXR without acute abnormalities.  - Pt denies smoking hx.   - BNP mild elevation / ULN. With hx of HF, may consider gentle diuresis.   - recommend out of bed more than in bed  - incentive spirometry  - duonebs PRN    # Diastolic heart failure s/p AICD  # A fib  # HTN  appears euvolemic  - cont amiodarone, eliquis, entresto  - Cards also om consult    # BPH  - cont tamsulosin and finasteride    # B/l Quadriceps tendon rupture s/p repair 3/22  - f/u recs from Orthopaedics  - PT  - DVT ppx  - bowel regimen  - pain control  - TSH  - will discuss with him if he has taken a quinolone abx recently.     # Morbid obesity  - nutrition consult placed         Alert-The patient is alert, awake and responds to voice. The patient is oriented to time, place, and person. The triage nurse is able to obtain subjective information.

## 2021-03-23 NOTE — PROGRESS NOTE ADULT - ASSESSMENT
POD #1 Bilateral quadriceps tendon repair    -PT/OT eval WBAT bilat LE NO FLEXION OF KNEES   -knee immobilizers on at all times awaiting braces from nakia Sinclair in full extension.  -pain controlled  -resuming eliquis today  -Desaturated O2-  CXR  ordered/managed by medical team  -d/c to rehab when medically stable and braces on  -plan discussed with Dr Caro POD #1 Bilateral quadriceps tendon repair    -PT/OT eval WBAT bilat LE NO FLEXION OF KNEES   -knee immobilizers on at all times awaiting braces from nakia Sinclair in full extension.  -pain controlled  -resuming eliquis today  -Desaturated O2-  CXR  ordered/managed by medical team  -multiple cardiopulmonary/medical problems being managed by medical/cardio team  -d/c to rehab when medically stable and braces on  -plan discussed with Dr Caro

## 2021-03-23 NOTE — CHART NOTE - NSCHARTNOTEFT_GEN_A_CORE
Order received from ortho PA to provide patient with bilateral rosa knee braces locked in full extension  Knee immobilizer removed and replaced with rosa brace.  Orange safety locks engaged  Follow up if needed      Kenny Sinclair [facility]  [phone]

## 2021-03-23 NOTE — PROGRESS NOTE ADULT - SUBJECTIVE AND OBJECTIVE BOX
POD #1 Bilateral quadriceps tendon repair    Patient resting comfortably in bed.  No new complaints. No SOB No CP . Pain controlled. voiding without difficulty. tolerating reg diet. As per RN patients's O2 sat was in 80's this am. Supplemental oxygen administered now reading 95% on 4LNC. Pt is in no acute respiratory distress.    Vital Signs Last 24 Hrs  T(C): 36.8 (23 Mar 2021 08:06), Max: 37.1 (22 Mar 2021 20:54)  T(F): 98.3 (23 Mar 2021 08:06), Max: 98.8 (22 Mar 2021 20:54)  HR: 77 (23 Mar 2021 08:06) (60 - 88)  BP: 130/72 (23 Mar 2021 08:06) (130/72 - 186/87)  BP(mean): 120 (22 Mar 2021 20:39) (120 - 131)  RR: 14 (23 Mar 2021 08:06) (14 - 18)  SpO2: 94% (23 Mar 2021 08:06) (94% - 99%)    PE:    Gen: NAD A&O x3  Bilat lower extremities: knee immobilizers/ ace wraps/ dressings in place. Pt is in full extension.  +2DP pulses bilat. NVI sensory intact. +EHL feet warm moves ankles, feet toes, gd cap refill                          13.9   11.59 )-----------( 241      ( 23 Mar 2021 06:00 )             42.0     03-23    141  |  105  |  20  ----------------------------<  155<H>  4.0   |  25  |  1.26    Ca    9.2      23 Mar 2021 06:00    I&O's Detail    22 Mar 2021 07:01  -  23 Mar 2021 07:00  --------------------------------------------------------  IN:    Lactated Ringers: 225 mL  Total IN: 225 mL    OUT:    Voided (mL): 350 mL  Total OUT: 350 mL    Total NET: -125 mL

## 2021-03-23 NOTE — PHYSICAL THERAPY INITIAL EVALUATION ADULT - RANGE OF MOTION EXAMINATION, REHAB EVAL
bilateral LE in knee immobilizers/Left UE ROM was WNL (within normal limits)/Right UE ROM was WNL (within normal limits)

## 2021-03-23 NOTE — OCCUPATIONAL THERAPY INITIAL EVALUATION ADULT - PERTINENT HX OF CURRENT PROBLEM, REHAB EVAL
Pt s/p fall in February 2021. Discharged from hospital to subacute where further imaging revealed bilateral quadriceps rupture. Pt now s/p bilateral quad repar

## 2021-03-23 NOTE — PROGRESS NOTE ADULT - SUBJECTIVE AND OBJECTIVE BOX
Patient is a 60y old  Male who presents with a chief complaint of bilateral quadriceps rupture (23 Mar 2021 14:17)      24 HOUR EVENTS:  No overnight events reported.   Post - OR repair of biceps    SUBJECTIVE:  Patient seen and examined at bedside.     ALLERGIES:  No Known Allergies    MEDICATIONS  (STANDING):  acetaminophen  IVPB .. 1000 milliGRAM(s) IV Intermittent once  aMIOdarone    Tablet 200 milliGRAM(s) Oral daily  apixaban 2.5 milliGRAM(s) Oral every 12 hours  atorvastatin 40 milliGRAM(s) Oral at bedtime  carvedilol 25 milliGRAM(s) Oral two times a day  finasteride 5 milliGRAM(s) Oral daily  influenza   Vaccine 0.5 milliLiter(s) IntraMuscular once  melatonin 3 milliGRAM(s) Oral at bedtime  ondansetron Injectable 4 milliGRAM(s) IV Push once  oxybutynin 10 milliGRAM(s) Oral daily  pantoprazole    Tablet 40 milliGRAM(s) Oral before breakfast  polyethylene glycol 3350 17 Gram(s) Oral at bedtime  sacubitril 49 mG/valsartan 51 mG 1 Tablet(s) Oral two times a day  senna 2 Tablet(s) Oral at bedtime  tamsulosin 0.4 milliGRAM(s) Oral at bedtime    MEDICATIONS  (PRN):  albuterol/ipratropium for Nebulization 3 milliLiter(s) Nebulizer every 6 hours PRN Shortness of Breath and/or Wheezing  bisacodyl Suppository 10 milliGRAM(s) Rectal once PRN Constipation  HYDROmorphone  Injectable 0.5 milliGRAM(s) IV Push every 3 hours PRN Severe Pain (7 - 10)  ketorolac   Injectable 30 milliGRAM(s) IV Push once PRN Severe Pain (7 - 10)  oxyCODONE    IR 10 milliGRAM(s) Oral every 3 hours PRN Severe Pain (7 - 10)  oxyCODONE    IR 5 milliGRAM(s) Oral every 3 hours PRN Moderate Pain (4 - 6)    Vital Signs Last 24 Hrs  T(F): 98.3 (23 Mar 2021 08:06), Max: 98.8 (22 Mar 2021 20:54)  HR: 77 (23 Mar 2021 13:01) (70 - 88)  BP: 130/72 (23 Mar 2021 08:06) (130/72 - 186/87)  RR: 14 (23 Mar 2021 08:06) (14 - 18)  SpO2: 94% (23 Mar 2021 13:01) (94% - 99%)  I&O's Summary    22 Mar 2021 07:01  -  23 Mar 2021 07:00  --------------------------------------------------------  IN: 225 mL / OUT: 350 mL / NET: -125 mL      PHYSICAL EXAM:  General: NAD, A/O x 3  ENT: Moist mucous membranes, no thrush  Neck: Supple, No JVD  Lungs: Clear to auscultation bilaterally, good air entry, non-labored breathing  Cardio: RRR, S1/S2, No murmur  Abdomen: Soft, Nontender, Nondistended; Bowel sounds present  Extremities: No calf tenderness, No pitting edema    LABS:                        13.9   11.59 )-----------( 241      ( 23 Mar 2021 06:00 )             42.0     03-23    141  |  105  |  20  ----------------------------<  155  4.0   |  25  |  1.26    Ca    9.2      23 Mar 2021 06:00          eGFR if Non African American: 61 mL/min/1.73M2 (03-23-21 @ 06:00)  eGFR if : 71 mL/min/1.73M2 (03-23-21 @ 06:00)      RADIOLOGY & ADDITIONAL TESTS:  < from: Xray Chest 1 View- PORTABLE-Routine (Xray Chest 1 View- PORTABLE-Routine in AM.) (03.23.21 @ 09:47) >  Impression: No evidence for acute pulmonary infiltrate, pleural effusion, or pneumothorax.  The trachea is midline.  The cardiac silhouette is within normal limits.  No pulmonary vascular congestion.  Left cardiac device is present. Another electronic device projecting over the left lung base.  < end of copied text >        Care Discussed with Consultants/Other Providers:   Orthopaedics - Ivett Saenz.  Patient is a 60y old  Male who presents with a chief complaint of bilateral quadriceps rupture (23 Mar 2021 14:17)      24 HOUR EVENTS:  No overnight events reported.   Post - OR repair of quadriceps rupture    SUBJECTIVE:  Patient seen and examined at bedside.   He has no acute complaints. Denies being on oxygen at home.   Pain under good control.  Has not had a bowel movement yet.    ALLERGIES:  No Known Allergies    MEDICATIONS  (STANDING):  acetaminophen  IVPB .. 1000 milliGRAM(s) IV Intermittent once  aMIOdarone    Tablet 200 milliGRAM(s) Oral daily  apixaban 2.5 milliGRAM(s) Oral every 12 hours  atorvastatin 40 milliGRAM(s) Oral at bedtime  carvedilol 25 milliGRAM(s) Oral two times a day  finasteride 5 milliGRAM(s) Oral daily  influenza   Vaccine 0.5 milliLiter(s) IntraMuscular once  melatonin 3 milliGRAM(s) Oral at bedtime  ondansetron Injectable 4 milliGRAM(s) IV Push once  oxybutynin 10 milliGRAM(s) Oral daily  pantoprazole    Tablet 40 milliGRAM(s) Oral before breakfast  polyethylene glycol 3350 17 Gram(s) Oral at bedtime  sacubitril 49 mG/valsartan 51 mG 1 Tablet(s) Oral two times a day  senna 2 Tablet(s) Oral at bedtime  tamsulosin 0.4 milliGRAM(s) Oral at bedtime    MEDICATIONS  (PRN):  albuterol/ipratropium for Nebulization 3 milliLiter(s) Nebulizer every 6 hours PRN Shortness of Breath and/or Wheezing  bisacodyl Suppository 10 milliGRAM(s) Rectal once PRN Constipation  HYDROmorphone  Injectable 0.5 milliGRAM(s) IV Push every 3 hours PRN Severe Pain (7 - 10)  ketorolac   Injectable 30 milliGRAM(s) IV Push once PRN Severe Pain (7 - 10)  oxyCODONE    IR 10 milliGRAM(s) Oral every 3 hours PRN Severe Pain (7 - 10)  oxyCODONE    IR 5 milliGRAM(s) Oral every 3 hours PRN Moderate Pain (4 - 6)    Vital Signs Last 24 Hrs  T(F): 98.3 (23 Mar 2021 08:06), Max: 98.8 (22 Mar 2021 20:54)  HR: 77 (23 Mar 2021 13:01) (70 - 88)  BP: 130/72 (23 Mar 2021 08:06) (130/72 - 186/87)  RR: 14 (23 Mar 2021 08:06) (14 - 18)  SpO2: 94% (23 Mar 2021 13:01) (94% - 99%)  I&O's Summary    22 Mar 2021 07:01  -  23 Mar 2021 07:00  --------------------------------------------------------  IN: 225 mL / OUT: 350 mL / NET: -125 mL      PHYSICAL EXAM:  General: morbidly obese, NAD, A/O x 3, lethargic/arousable  ENT: Moist mucous membranes, no thrush  Neck: Supple, No JVD  Lungs: Clear to auscultation bilaterally, good air entry, non-labored breathing, no wheezing. speaks in full sentences.   Cardio: RRR, S1/S2, No murmur  Abdomen: Soft, Nontender, Nondistended; Bowel sounds present  Extremities: No calf tenderness, No pitting edema    LABS:                        13.9   11.59 )-----------( 241      ( 23 Mar 2021 06:00 )             42.0     03-23    141  |  105  |  20  ----------------------------<  155  4.0   |  25  |  1.26    Ca    9.2      23 Mar 2021 06:00          eGFR if Non African American: 61 mL/min/1.73M2 (03-23-21 @ 06:00)  eGFR if : 71 mL/min/1.73M2 (03-23-21 @ 06:00)      RADIOLOGY & ADDITIONAL TESTS:  < from: Xray Chest 1 View- PORTABLE-Routine (Xray Chest 1 View- PORTABLE-Routine in AM.) (03.23.21 @ 09:47) >  Impression: No evidence for acute pulmonary infiltrate, pleural effusion, or pneumothorax.  The trachea is midline.  The cardiac silhouette is within normal limits.  No pulmonary vascular congestion.  Left cardiac device is present. Another electronic device projecting over the left lung base.  < end of copied text >        Care Discussed with Consultants/Other Providers:   Orthopaedics - Ivett Saenz.

## 2021-03-23 NOTE — OCCUPATIONAL THERAPY INITIAL EVALUATION ADULT - GENERAL OBSERVATIONS, REHAB EVAL
Pt received supine in bed +PIV, cardiac monitor, tele pulse-ox, bilateral knee extension braces, 4 LPM O2 via nasal cannula. Alert and oriented x4 following multi step directions on this date. Vitals stable. Pt left supine in bed with call bell in reach and all lines intact. PT consulted with on functional status and discharge plan.

## 2021-03-24 LAB
ANION GAP SERPL CALC-SCNC: 10 MMOL/L — SIGNIFICANT CHANGE UP (ref 5–17)
BUN SERPL-MCNC: 25 MG/DL — HIGH (ref 7–23)
CALCIUM SERPL-MCNC: 8.8 MG/DL — SIGNIFICANT CHANGE UP (ref 8.4–10.5)
CHLORIDE SERPL-SCNC: 104 MMOL/L — SIGNIFICANT CHANGE UP (ref 96–108)
CO2 SERPL-SCNC: 27 MMOL/L — SIGNIFICANT CHANGE UP (ref 22–31)
CREAT SERPL-MCNC: 1.2 MG/DL — SIGNIFICANT CHANGE UP (ref 0.5–1.3)
GLUCOSE SERPL-MCNC: 111 MG/DL — HIGH (ref 70–99)
HCT VFR BLD CALC: 37.6 % — LOW (ref 39–50)
HGB BLD-MCNC: 12.5 G/DL — LOW (ref 13–17)
MCHC RBC-ENTMCNC: 32.4 PG — SIGNIFICANT CHANGE UP (ref 27–34)
MCHC RBC-ENTMCNC: 33.2 GM/DL — SIGNIFICANT CHANGE UP (ref 32–36)
MCV RBC AUTO: 97.4 FL — SIGNIFICANT CHANGE UP (ref 80–100)
NRBC # BLD: 0 /100 WBCS — SIGNIFICANT CHANGE UP (ref 0–0)
PLATELET # BLD AUTO: 190 K/UL — SIGNIFICANT CHANGE UP (ref 150–400)
POTASSIUM SERPL-MCNC: 3.8 MMOL/L — SIGNIFICANT CHANGE UP (ref 3.5–5.3)
POTASSIUM SERPL-SCNC: 3.8 MMOL/L — SIGNIFICANT CHANGE UP (ref 3.5–5.3)
RBC # BLD: 3.86 M/UL — LOW (ref 4.2–5.8)
RBC # FLD: 14.2 % — SIGNIFICANT CHANGE UP (ref 10.3–14.5)
SODIUM SERPL-SCNC: 141 MMOL/L — SIGNIFICANT CHANGE UP (ref 135–145)
TSH SERPL-MCNC: 18.9 UIU/ML — HIGH (ref 0.27–4.2)
WBC # BLD: 11.87 K/UL — HIGH (ref 3.8–10.5)
WBC # FLD AUTO: 11.87 K/UL — HIGH (ref 3.8–10.5)

## 2021-03-24 PROCEDURE — 99232 SBSQ HOSP IP/OBS MODERATE 35: CPT

## 2021-03-24 PROCEDURE — 99233 SBSQ HOSP IP/OBS HIGH 50: CPT

## 2021-03-24 RX ORDER — MAGNESIUM HYDROXIDE 400 MG/1
30 TABLET, CHEWABLE ORAL DAILY
Refills: 0 | Status: DISCONTINUED | OUTPATIENT
Start: 2021-03-24 | End: 2021-04-02

## 2021-03-24 RX ADMIN — APIXABAN 5 MILLIGRAM(S): 2.5 TABLET, FILM COATED ORAL at 05:09

## 2021-03-24 RX ADMIN — OXYCODONE HYDROCHLORIDE 10 MILLIGRAM(S): 5 TABLET ORAL at 23:51

## 2021-03-24 RX ADMIN — SACUBITRIL AND VALSARTAN 1 TABLET(S): 24; 26 TABLET, FILM COATED ORAL at 17:23

## 2021-03-24 RX ADMIN — CARVEDILOL PHOSPHATE 25 MILLIGRAM(S): 80 CAPSULE, EXTENDED RELEASE ORAL at 17:23

## 2021-03-24 RX ADMIN — Medication 975 MILLIGRAM(S): at 13:24

## 2021-03-24 RX ADMIN — CARVEDILOL PHOSPHATE 25 MILLIGRAM(S): 80 CAPSULE, EXTENDED RELEASE ORAL at 05:09

## 2021-03-24 RX ADMIN — ATORVASTATIN CALCIUM 40 MILLIGRAM(S): 80 TABLET, FILM COATED ORAL at 21:45

## 2021-03-24 RX ADMIN — OXYCODONE HYDROCHLORIDE 10 MILLIGRAM(S): 5 TABLET ORAL at 23:21

## 2021-03-24 RX ADMIN — PANTOPRAZOLE SODIUM 40 MILLIGRAM(S): 20 TABLET, DELAYED RELEASE ORAL at 05:09

## 2021-03-24 RX ADMIN — Medication 975 MILLIGRAM(S): at 05:09

## 2021-03-24 RX ADMIN — HYDROMORPHONE HYDROCHLORIDE 0.5 MILLIGRAM(S): 2 INJECTION INTRAMUSCULAR; INTRAVENOUS; SUBCUTANEOUS at 06:38

## 2021-03-24 RX ADMIN — Medication 10 MILLIGRAM(S): at 12:52

## 2021-03-24 RX ADMIN — SENNA PLUS 2 TABLET(S): 8.6 TABLET ORAL at 21:45

## 2021-03-24 RX ADMIN — Medication 975 MILLIGRAM(S): at 13:54

## 2021-03-24 RX ADMIN — APIXABAN 5 MILLIGRAM(S): 2.5 TABLET, FILM COATED ORAL at 17:23

## 2021-03-24 RX ADMIN — SACUBITRIL AND VALSARTAN 1 TABLET(S): 24; 26 TABLET, FILM COATED ORAL at 05:09

## 2021-03-24 RX ADMIN — Medication 975 MILLIGRAM(S): at 22:15

## 2021-03-24 RX ADMIN — Medication 975 MILLIGRAM(S): at 21:45

## 2021-03-24 RX ADMIN — AMIODARONE HYDROCHLORIDE 200 MILLIGRAM(S): 400 TABLET ORAL at 05:09

## 2021-03-24 RX ADMIN — HYDROMORPHONE HYDROCHLORIDE 0.5 MILLIGRAM(S): 2 INJECTION INTRAMUSCULAR; INTRAVENOUS; SUBCUTANEOUS at 06:07

## 2021-03-24 RX ADMIN — HYDROMORPHONE HYDROCHLORIDE 0.5 MILLIGRAM(S): 2 INJECTION INTRAMUSCULAR; INTRAVENOUS; SUBCUTANEOUS at 17:20

## 2021-03-24 RX ADMIN — Medication 975 MILLIGRAM(S): at 06:00

## 2021-03-24 RX ADMIN — HYDROMORPHONE HYDROCHLORIDE 0.5 MILLIGRAM(S): 2 INJECTION INTRAMUSCULAR; INTRAVENOUS; SUBCUTANEOUS at 17:50

## 2021-03-24 RX ADMIN — TAMSULOSIN HYDROCHLORIDE 0.4 MILLIGRAM(S): 0.4 CAPSULE ORAL at 21:45

## 2021-03-24 RX ADMIN — FINASTERIDE 5 MILLIGRAM(S): 5 TABLET, FILM COATED ORAL at 12:52

## 2021-03-24 NOTE — PROGRESS NOTE ADULT - SUBJECTIVE AND OBJECTIVE BOX
F/U Note:    60M POD #2 from B/L quadriceps repair, S/P rupture    Interval hx:  -patient sitting up in bed, eting dinner tray, currently without complaints. WIll hope to work with PT soon    ROS:   patient offers no complaintsa t this time\    Physical Exam:    NEURO: Awake/alert  CV: (+) S1/S2, irregularly irregular, no MRG   RESp: CTA bl  GI: soft, non tender        LABS:                          12.5   11.87 )-----------( 190      ( 24 Mar 2021 05:30 )             37.6         03-24    141  |  104  |  25<H>  ----------------------------<  111<H>  3.8   |  27  |  1.20    Ca    8.8      24 Mar 2021 05:30

## 2021-03-24 NOTE — PROGRESS NOTE ADULT - ASSESSMENT
60 year old male , hx of morbid obesity, htn, hld, a fib, cardiomyapathy, CAD , + cardiac stents , bph , is surgically stable .       Plan:  Discuss plan with Dr Caro            PT/OT in bed for now           DVT prophylaxis with Eliquis            Absolutely no leg flexion           WBAT bilateral when patient becomes able to be OOB

## 2021-03-24 NOTE — PROGRESS NOTE ADULT - SUBJECTIVE AND OBJECTIVE BOX
Patient is a 60y old  Male who presents with a chief complaint of bilateral quadriceps rupture (24 Mar 2021 10:38)      24 HOUR EVENTS:  No overnight events reported.     SUBJECTIVE:  Patient seen and examined at bedside.     ALLERGIES:  No Known Allergies    MEDICATIONS  (STANDING):  acetaminophen   Tablet .. 975 milliGRAM(s) Oral every 8 hours  aMIOdarone    Tablet 200 milliGRAM(s) Oral daily  apixaban 5 milliGRAM(s) Oral two times a day  atorvastatin 40 milliGRAM(s) Oral at bedtime  carvedilol 25 milliGRAM(s) Oral two times a day  finasteride 5 milliGRAM(s) Oral daily  influenza   Vaccine 0.5 milliLiter(s) IntraMuscular once  melatonin 3 milliGRAM(s) Oral at bedtime  ondansetron Injectable 4 milliGRAM(s) IV Push once  oxybutynin 10 milliGRAM(s) Oral daily  pantoprazole    Tablet 40 milliGRAM(s) Oral before breakfast  polyethylene glycol 3350 17 Gram(s) Oral at bedtime  sacubitril 49 mG/valsartan 51 mG 1 Tablet(s) Oral two times a day  senna 2 Tablet(s) Oral at bedtime  tamsulosin 0.4 milliGRAM(s) Oral at bedtime    MEDICATIONS  (PRN):  albuterol/ipratropium for Nebulization 3 milliLiter(s) Nebulizer every 6 hours PRN Shortness of Breath and/or Wheezing  bisacodyl Suppository 10 milliGRAM(s) Rectal once PRN Constipation  HYDROmorphone  Injectable 0.5 milliGRAM(s) IV Push every 3 hours PRN Severe Pain (7 - 10)  ketorolac   Injectable 30 milliGRAM(s) IV Push once PRN Severe Pain (7 - 10)  oxyCODONE    IR 10 milliGRAM(s) Oral every 3 hours PRN Severe Pain (7 - 10)  oxyCODONE    IR 5 milliGRAM(s) Oral every 3 hours PRN Moderate Pain (4 - 6)    Vital Signs Last 24 Hrs  T(F): 97.7 (24 Mar 2021 08:29), Max: 98.4 (23 Mar 2021 23:24)  HR: 72 (24 Mar 2021 08:58) (62 - 82)  BP: 109/56 (24 Mar 2021 08:29) (96/57 - 139/77)  RR: 13 (24 Mar 2021 08:29) (13 - 18)  SpO2: 93% (24 Mar 2021 08:58) (93% - 95%)  I&O's Summary    23 Mar 2021 07:01  -  24 Mar 2021 07:00  --------------------------------------------------------  IN: 200 mL / OUT: 800 mL / NET: -600 mL      PHYSICAL EXAM:  General: morbidly obese, NAD, A/O x 3, lethargic/arousable  ENT: Moist mucous membranes, no thrush  Neck: Supple, No JVD  Lungs: Clear to auscultation bilaterally, good air entry, non-labored breathing, no wheezing. speaks in full sentences.   Cardio: RRR, S1/S2, No murmur  Abdomen: Soft, Nontender, Nondistended; Bowel sounds present  Extremities: No calf tenderness, No pitting edema. wearing b/l LE leg braces    LABS:                        12.5   11.87 )-----------( 190      ( 24 Mar 2021 05:30 )             37.6     03-24    141  |  104  |  25  ----------------------------<  111  3.8   |  27  |  1.20    Ca    8.8      24 Mar 2021 05:30          eGFR if Non African American: 65 mL/min/1.73M2 (03-24-21 @ 05:30)  eGFR if African American: 75 mL/min/1.73M2 (03-24-21 @ 05:30)    TSH 18.90   TSH with FT4 reflex --  Total T3 --        RADIOLOGY & ADDITIONAL TESTS:    Care Discussed with Consultants/Other Providers:   Ivett Saenz, Orthopaedics.    Patient is a 60y old  Male who presents with a chief complaint of bilateral quadriceps rupture (24 Mar 2021 10:38)      24 HOUR EVENTS:  No overnight events reported.     SUBJECTIVE:  Patient seen and examined at bedside.   Pain under control, still no BM yet.    ALLERGIES:  No Known Allergies    MEDICATIONS  (STANDING):  acetaminophen   Tablet .. 975 milliGRAM(s) Oral every 8 hours  aMIOdarone    Tablet 200 milliGRAM(s) Oral daily  apixaban 5 milliGRAM(s) Oral two times a day  atorvastatin 40 milliGRAM(s) Oral at bedtime  carvedilol 25 milliGRAM(s) Oral two times a day  finasteride 5 milliGRAM(s) Oral daily  influenza   Vaccine 0.5 milliLiter(s) IntraMuscular once  melatonin 3 milliGRAM(s) Oral at bedtime  ondansetron Injectable 4 milliGRAM(s) IV Push once  oxybutynin 10 milliGRAM(s) Oral daily  pantoprazole    Tablet 40 milliGRAM(s) Oral before breakfast  polyethylene glycol 3350 17 Gram(s) Oral at bedtime  sacubitril 49 mG/valsartan 51 mG 1 Tablet(s) Oral two times a day  senna 2 Tablet(s) Oral at bedtime  tamsulosin 0.4 milliGRAM(s) Oral at bedtime    MEDICATIONS  (PRN):  albuterol/ipratropium for Nebulization 3 milliLiter(s) Nebulizer every 6 hours PRN Shortness of Breath and/or Wheezing  bisacodyl Suppository 10 milliGRAM(s) Rectal once PRN Constipation  HYDROmorphone  Injectable 0.5 milliGRAM(s) IV Push every 3 hours PRN Severe Pain (7 - 10)  ketorolac   Injectable 30 milliGRAM(s) IV Push once PRN Severe Pain (7 - 10)  oxyCODONE    IR 10 milliGRAM(s) Oral every 3 hours PRN Severe Pain (7 - 10)  oxyCODONE    IR 5 milliGRAM(s) Oral every 3 hours PRN Moderate Pain (4 - 6)    Vital Signs Last 24 Hrs  T(F): 97.7 (24 Mar 2021 08:29), Max: 98.4 (23 Mar 2021 23:24)  HR: 72 (24 Mar 2021 08:58) (62 - 82)  BP: 109/56 (24 Mar 2021 08:29) (96/57 - 139/77)  RR: 13 (24 Mar 2021 08:29) (13 - 18)  SpO2: 93% (24 Mar 2021 08:58) (93% - 95%)  I&O's Summary    23 Mar 2021 07:01  -  24 Mar 2021 07:00  --------------------------------------------------------  IN: 200 mL / OUT: 800 mL / NET: -600 mL      PHYSICAL EXAM:  General: morbidly obese, NAD, A/O x 3, lethargic/arousable  ENT: Moist mucous membranes, no thrush  Neck: Supple, No JVD  Lungs: Clear to auscultation bilaterally, good air entry, non-labored breathing, no wheezing. speaks in full sentences.   Cardio: RRR, S1/S2, No murmur  Abdomen: Soft, Nontender, Nondistended; Bowel sounds present  Extremities: No calf tenderness, No pitting edema. wearing b/l LE leg braces    LABS:                        12.5   11.87 )-----------( 190      ( 24 Mar 2021 05:30 )             37.6     03-24    141  |  104  |  25  ----------------------------<  111  3.8   |  27  |  1.20    Ca    8.8      24 Mar 2021 05:30          eGFR if Non African American: 65 mL/min/1.73M2 (03-24-21 @ 05:30)  eGFR if African American: 75 mL/min/1.73M2 (03-24-21 @ 05:30)    TSH 18.90   TSH with FT4 reflex --  Total T3 --        RADIOLOGY & ADDITIONAL TESTS:    Care Discussed with Consultants/Other Providers:   Ivett Saenz, Orthopaedics.    alert and awake

## 2021-03-24 NOTE — PROGRESS NOTE ADULT - ASSESSMENT
60 year old man admitted for surgery to repair bilateral quadraceps tendons.... surgery done 3/22.  Cardiac history includes CAD s/p coronary interventions, ischemic cardiomyopathy now with improved LV function, s/p ICD, PAF on anticoagulation... 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

## 2021-03-24 NOTE — DIETITIAN INITIAL EVALUATION ADULT. - PERTINENT MEDS FT
Eliquis, Proscar, Coreg, Lipitor , Dilaudid, Tramadol, Melatonin, Zofran, Miralax, Protonix, Entresto, Senna, Flomax,

## 2021-03-24 NOTE — PROGRESS NOTE ADULT - ASSESSMENT
PLAN:    -PTO in bed  -per ortho note absolutely NO flexion, patient with pillow in place to prevent  -pain control  -serial exams  -eliquis for DVT prophylaxis and afib tx  -f/u Am labs

## 2021-03-24 NOTE — DIETITIAN INITIAL EVALUATION ADULT. - OTHER INFO
60 year old male obese, ERICK, cardiomyapathy, , CAD., CHF, Cardiac stents, afib, htn, hld, bph presents for repair of bilateral quadriceps rupture . Patient reports tolerating diet with good appetite , % , No BM noted , No edema. Encouraged weight loss due to current weight , reviewed weight loss principles.Suggest diet changed to DASH/TLC due to past cardiac history 60 year old male obese, ERICK, cardiomyapathy, , CAD., CHF, Cardiac stents, afib, htn, hld, bph presents for repair of bilateral quadriceps rupture . Patient reports tolerating diet with good appetite , % , No BM noted , No edema. Encouraged weight loss due to current weight , reviewed weight loss/ Healthy Heart diet  principles. Suggest diet changed to DASH/TLC due to past cardiac history

## 2021-03-24 NOTE — DIETITIAN INITIAL EVALUATION ADULT. - NUTRITIONGOAL OUTCOME1
Patient to meet 75% of assessed needs during hospitalization with weight loss due to portion control

## 2021-03-24 NOTE — PROGRESS NOTE ADULT - SUBJECTIVE AND OBJECTIVE BOX
`SUBJ:  Patient is a 60y old  Male who presents with a chief complaint of bilateral quadriceps rupture (23 Mar 2021 15:18)  patient seen and examined  case discussed with Dr. De La Rosa  chart is reviewed  patient in bed... comfortable... no cardiopulmonary complaints       PAST MEDICAL & SURGICAL HISTORY:  Chronic diastolic congestive heart failure    BPH (benign prostatic hyperplasia)    Morbid obesity    HLD (hyperlipidemia)    Effusion of both knee joints    HTN (hypertension)    ERICK (obstructive sleep apnea)    Syncope    Cardiomyopathy    Paroxysmal A-fib    CAD (coronary artery disease)    History of appendectomy    Acute DVT (deep venous thrombosis)    Biventricular ICD (implantable cardioverter-defibrillator) in place    Coronary stent patent      Home Medications:  amiodarone 200 mg oral tablet: 1 tab(s) orally once a day (22 Mar 2021 20:51)  atorvastatin 40 mg oral tablet: 1 tab(s) orally once a day (22 Mar 2021 20:51)  carvedilol 25 mg oral tablet: 1 tab(s) orally 2 times a day (22 Mar 2021 20:51)  Eliquis 5 mg oral tablet: 1 tab(s) orally 2 times a day (22 Mar 2021 20:51)  Entresto 49 mg-51 mg oral tablet: 1 tab(s) orally 2 times a day (22 Mar 2021 20:51)  finasteride 5 mg oral tablet: 1 tab(s) orally once a day (22 Mar 2021 20:51)  Flomax 0.4 mg oral capsule: 1 cap(s) orally once a day (22 Mar 2021 20:51)  Melatonin 3 mg oral tablet: 1 tab(s) orally once a day (at bedtime) (22 Mar 2021 20:51)  oxybutynin 10 mg/24 hr oral tablet, extended release: 1 tab(s) orally once a day (22 Mar 2021 20:51)  traMADol 50 mg oral tablet: orally every 6 hours, As Needed (22 Mar 2021 20:51)    MEDICATIONS  (STANDING):  acetaminophen   Tablet .. 975 milliGRAM(s) Oral every 8 hours  aMIOdarone    Tablet 200 milliGRAM(s) Oral daily  apixaban 5 milliGRAM(s) Oral two times a day  atorvastatin 40 milliGRAM(s) Oral at bedtime  carvedilol 25 milliGRAM(s) Oral two times a day  finasteride 5 milliGRAM(s) Oral daily  influenza   Vaccine 0.5 milliLiter(s) IntraMuscular once  melatonin 3 milliGRAM(s) Oral at bedtime  ondansetron Injectable 4 milliGRAM(s) IV Push once  oxybutynin 10 milliGRAM(s) Oral daily  pantoprazole    Tablet 40 milliGRAM(s) Oral before breakfast  polyethylene glycol 3350 17 Gram(s) Oral at bedtime  sacubitril 49 mG/valsartan 51 mG 1 Tablet(s) Oral two times a day  senna 2 Tablet(s) Oral at bedtime  tamsulosin 0.4 milliGRAM(s) Oral at bedtime    MEDICATIONS  (PRN):  albuterol/ipratropium for Nebulization 3 milliLiter(s) Nebulizer every 6 hours PRN Shortness of Breath and/or Wheezing  bisacodyl Suppository 10 milliGRAM(s) Rectal once PRN Constipation  HYDROmorphone  Injectable 0.5 milliGRAM(s) IV Push every 3 hours PRN Severe Pain (7 - 10)  ketorolac   Injectable 30 milliGRAM(s) IV Push once PRN Severe Pain (7 - 10)  oxyCODONE    IR 10 milliGRAM(s) Oral every 3 hours PRN Severe Pain (7 - 10)  oxyCODONE    IR 5 milliGRAM(s) Oral every 3 hours PRN Moderate Pain (4 - 6)          Vital Signs Last 24 Hrs  T(C): 36.5 (24 Mar 2021 08:29), Max: 36.9 (23 Mar 2021 23:24)  T(F): 97.7 (24 Mar 2021 08:29), Max: 98.4 (23 Mar 2021 23:24)  HR: 72 (24 Mar 2021 08:58) (62 - 82)  BP: 109/56 (24 Mar 2021 08:29) (96/57 - 139/77)  BP(mean): --  RR: 13 (24 Mar 2021 08:29) (13 - 18)  SpO2: 93% (24 Mar 2021 08:58) (93% - 95%)    REVIEW OF SYSTEMS:  CONSTITUTIONAL: No fever, weight loss, or fatigue  RESPIRATORY: No cough, wheezing, chills or hemoptysis; No shortness of breath  CARDIOVASCULAR: No chest pain or chest pressure.  No shortness of breath or dyspnea on exertion.  No palpitations, dizziness, light headedness, syncope or near syncope.  No edema, no orthopnea.   NEUROLOGICAL: No headaches, memory loss, loss of strength, numbness, or tremors      PHYSICAL EXAM  Constitutional:  WDWN. No acute distress. obese   HEENT: normocephalic, atraumatic.  PERRLA. EOMI  Neck : No JVD. no carotid bruits  Lungs:  clear to auscultation bilaterally. no rhonchi. no wheezing  Heart:  S1 and S2. No S3, S4. I/VI systolic murmur.  Abdomen:  soft, non tender.  Extremities: No clubbing, cyanoisis or edema  Nuerologic:  A+O x 3. No focal deficits  Skin:  no rashes        LABS:                        12.5   11.87 )-----------( 190      ( 24 Mar 2021 05:30 )             37.6     03-24    141  |  104  |  25<H>  ----------------------------<  111<H>  3.8   |  27  |  1.20    Ca    8.8      24 Mar 2021 05:30          I&O's Summary    23 Mar 2021 07:01  -  24 Mar 2021 07:00  --------------------------------------------------------  IN: 200 mL / OUT: 800 mL / NET: -600 mL    tele

## 2021-03-24 NOTE — PROGRESS NOTE ADULT - ASSESSMENT
60 year old morbidly obese male with  HTN, Afib on Eliquis, Cardiomyopathy (latest EF 53% - improved from 25%), s/p AICD, ERICK (cannot tolerate CPAP-noncompliant), HLD, BPH, arthritis, s/p fall 2/4/2021, discharged to rehab. After 2 weeks of rehab without improvement in gait, further work up was performed and he was found to have bilateral quadriceps tendon rupture. S/p repair on 3/22 with Orthopaedist Dr. Caro.    # acute hypoxic respiratory failure likely due to atelectasis/obesity  # ERICK  - suspect atelectasis.   - CXR without acute abnormalities.  - Pt denies smoking hx.   - BNP mild elevation / ULN. Appears euvolemic.   - recommend out of bed more than in bed or at least sit him up in bed   - incentive spirometry  - duonebs PRN    # Diastolic heart failure s/p AICD (Heart failure preserved ejection fraction improved)  # A fib  # HTN  appears euvolemic  - cont amiodarone, eliquis, entresto  - Cards also on consult. Recommend continued outpatient Cardiology f/u on discharge.     # BPH  - cont tamsulosin and finasteride    # B/l Quadriceps tendon rupture s/p repair 3/22  - f/u recs from Orthopaedics  - PT  - DVT ppx  - bowel regimen  - pain control  - no recent quinolone use.    # Elevated TSH  - TSH elevated. F/u Free T4    # Morbid obesity  - nutrition consult placed   60 year old morbidly obese male with  HTN, Afib on Eliquis, Cardiomyopathy (latest EF 53% - improved from 25%), s/p AICD, ERICK (cannot tolerate CPAP-noncompliant), HLD, BPH, arthritis, s/p fall 2/4/2021, discharged to rehab. After 2 weeks of rehab without improvement in gait, further work up was performed and he was found to have bilateral quadriceps tendon rupture. S/p repair on 3/22 with Orthopaedist Dr. Caro.    # acute hypoxic respiratory failure likely due to atelectasis/obesity  # ERICK  - suspect atelectasis. Able to wean O2 from 6L -->2 L just with incentive spirometry. Continue IS.   - CXR without acute abnormalities.  - Pt denies smoking hx.   - BNP mild elevation / ULN. Appears euvolemic.   - recommend out of bed more than in bed or at least sit him up in bed   - duonebs PRN    # Diastolic heart failure s/p AICD (Heart failure preserved ejection fraction improved)  # A fib  # HTN  appears euvolemic  - cont amiodarone, eliquis, entresto  - Cards also on consult. Recommend continued outpatient Cardiology f/u on discharge.     # BPH  - cont tamsulosin and finasteride    # B/l Quadriceps tendon rupture s/p repair 3/22  - f/u recs from Orthopaedics - strict bed rest for now.   - PT  - DVT ppx  - bowel regimen - recommend more aggressive in setting of constipation  - pain control  - no recent quinolone use.    # Elevated TSH  - TSH elevated. F/u Free T4    # Morbid obesity  - nutrition consult placed

## 2021-03-25 LAB
ANION GAP SERPL CALC-SCNC: 7 MMOL/L — SIGNIFICANT CHANGE UP (ref 5–17)
BUN SERPL-MCNC: 26 MG/DL — HIGH (ref 7–23)
CALCIUM SERPL-MCNC: 8.6 MG/DL — SIGNIFICANT CHANGE UP (ref 8.4–10.5)
CHLORIDE SERPL-SCNC: 108 MMOL/L — SIGNIFICANT CHANGE UP (ref 96–108)
CO2 SERPL-SCNC: 28 MMOL/L — SIGNIFICANT CHANGE UP (ref 22–31)
CREAT SERPL-MCNC: 1.1 MG/DL — SIGNIFICANT CHANGE UP (ref 0.5–1.3)
GLUCOSE SERPL-MCNC: 115 MG/DL — HIGH (ref 70–99)
HCT VFR BLD CALC: 35.2 % — LOW (ref 39–50)
HGB BLD-MCNC: 11.7 G/DL — LOW (ref 13–17)
MCHC RBC-ENTMCNC: 32.3 PG — SIGNIFICANT CHANGE UP (ref 27–34)
MCHC RBC-ENTMCNC: 33.2 GM/DL — SIGNIFICANT CHANGE UP (ref 32–36)
MCV RBC AUTO: 97.2 FL — SIGNIFICANT CHANGE UP (ref 80–100)
NRBC # BLD: 0 /100 WBCS — SIGNIFICANT CHANGE UP (ref 0–0)
PLATELET # BLD AUTO: 173 K/UL — SIGNIFICANT CHANGE UP (ref 150–400)
POTASSIUM SERPL-MCNC: 3.8 MMOL/L — SIGNIFICANT CHANGE UP (ref 3.5–5.3)
POTASSIUM SERPL-SCNC: 3.8 MMOL/L — SIGNIFICANT CHANGE UP (ref 3.5–5.3)
RBC # BLD: 3.62 M/UL — LOW (ref 4.2–5.8)
RBC # FLD: 14.3 % — SIGNIFICANT CHANGE UP (ref 10.3–14.5)
SODIUM SERPL-SCNC: 143 MMOL/L — SIGNIFICANT CHANGE UP (ref 135–145)
T3 SERPL-MCNC: 72 NG/DL — LOW (ref 80–200)
T4 FREE SERPL-MCNC: 1 NG/DL — SIGNIFICANT CHANGE UP (ref 0.9–1.8)
WBC # BLD: 7.58 K/UL — SIGNIFICANT CHANGE UP (ref 3.8–10.5)
WBC # FLD AUTO: 7.58 K/UL — SIGNIFICANT CHANGE UP (ref 3.8–10.5)

## 2021-03-25 PROCEDURE — 99232 SBSQ HOSP IP/OBS MODERATE 35: CPT

## 2021-03-25 RX ORDER — LEVOTHYROXINE SODIUM 125 MCG
50 TABLET ORAL DAILY
Refills: 0 | Status: DISCONTINUED | OUTPATIENT
Start: 2021-03-25 | End: 2021-03-26

## 2021-03-25 RX ADMIN — CARVEDILOL PHOSPHATE 25 MILLIGRAM(S): 80 CAPSULE, EXTENDED RELEASE ORAL at 17:25

## 2021-03-25 RX ADMIN — Medication 975 MILLIGRAM(S): at 14:33

## 2021-03-25 RX ADMIN — SACUBITRIL AND VALSARTAN 1 TABLET(S): 24; 26 TABLET, FILM COATED ORAL at 17:25

## 2021-03-25 RX ADMIN — Medication 975 MILLIGRAM(S): at 15:24

## 2021-03-25 RX ADMIN — Medication 3 MILLIGRAM(S): at 21:04

## 2021-03-25 RX ADMIN — Medication 975 MILLIGRAM(S): at 21:04

## 2021-03-25 RX ADMIN — ATORVASTATIN CALCIUM 40 MILLIGRAM(S): 80 TABLET, FILM COATED ORAL at 21:05

## 2021-03-25 RX ADMIN — Medication 975 MILLIGRAM(S): at 05:12

## 2021-03-25 RX ADMIN — CARVEDILOL PHOSPHATE 25 MILLIGRAM(S): 80 CAPSULE, EXTENDED RELEASE ORAL at 05:14

## 2021-03-25 RX ADMIN — SACUBITRIL AND VALSARTAN 1 TABLET(S): 24; 26 TABLET, FILM COATED ORAL at 05:13

## 2021-03-25 RX ADMIN — Medication 975 MILLIGRAM(S): at 22:04

## 2021-03-25 RX ADMIN — APIXABAN 5 MILLIGRAM(S): 2.5 TABLET, FILM COATED ORAL at 17:25

## 2021-03-25 RX ADMIN — Medication 10 MILLIGRAM(S): at 12:31

## 2021-03-25 RX ADMIN — APIXABAN 5 MILLIGRAM(S): 2.5 TABLET, FILM COATED ORAL at 05:14

## 2021-03-25 RX ADMIN — HYDROMORPHONE HYDROCHLORIDE 0.5 MILLIGRAM(S): 2 INJECTION INTRAMUSCULAR; INTRAVENOUS; SUBCUTANEOUS at 21:05

## 2021-03-25 RX ADMIN — AMIODARONE HYDROCHLORIDE 200 MILLIGRAM(S): 400 TABLET ORAL at 05:12

## 2021-03-25 RX ADMIN — HYDROMORPHONE HYDROCHLORIDE 0.5 MILLIGRAM(S): 2 INJECTION INTRAMUSCULAR; INTRAVENOUS; SUBCUTANEOUS at 21:20

## 2021-03-25 RX ADMIN — TAMSULOSIN HYDROCHLORIDE 0.4 MILLIGRAM(S): 0.4 CAPSULE ORAL at 21:04

## 2021-03-25 RX ADMIN — FINASTERIDE 5 MILLIGRAM(S): 5 TABLET, FILM COATED ORAL at 12:31

## 2021-03-25 RX ADMIN — Medication 975 MILLIGRAM(S): at 05:45

## 2021-03-25 RX ADMIN — PANTOPRAZOLE SODIUM 40 MILLIGRAM(S): 20 TABLET, DELAYED RELEASE ORAL at 08:52

## 2021-03-25 NOTE — PROGRESS NOTE ADULT - SUBJECTIVE AND OBJECTIVE BOX
Patient is a 60y old  Male who presents with a chief complaint of bilateral quadriceps rupture (24 Mar 2021 19:36)      BRIEF HOSPITAL COURSE: 61 y/o male with pmhx obesiety, HLD, HTN, ERICK, pAfib on a/c, ischemic cardiomyopathy s/p ICD (LV function improved now) admitted on 3/22 for elective bilateral quadriceps tendon repair.     Events last 24 hours: contact by tele tech for desat events to 60%. patient is sleeping and comfortable. desat events correlate with episodes of sleep apnea. quickly improves on his own to sat of 95%    PAST MEDICAL & SURGICAL HISTORY:  Chronic diastolic congestive heart failure    BPH (benign prostatic hyperplasia)    Morbid obesity    HLD (hyperlipidemia)    Effusion of both knee joints    HTN (hypertension)    ERICK (obstructive sleep apnea)    Syncope    Cardiomyopathy    Paroxysmal A-fib    CAD (coronary artery disease)    History of appendectomy    Acute DVT (deep venous thrombosis)    Biventricular ICD (implantable cardioverter-defibrillator) in place    Coronary stent patent        Review of Systems:  CONSTITUTIONAL: No fever, chills, or fatigue  EYES: No eye pain, visual disturbances, or discharge  ENMT:  No difficulty hearing, tinnitus, vertigo; No sinus or throat pain  NECK: No pain or stiffness  RESPIRATORY: No cough, wheezing, chills or hemoptysis; No shortness of breath  CARDIOVASCULAR: No chest pain, palpitations, dizziness, or leg swelling  GASTROINTESTINAL: No abdominal or epigastric pain. No nausea, vomiting, or hematemesis; No diarrhea or constipation. No melena or hematochezia.  GENITOURINARY: No dysuria, frequency, hematuria, or incontinence  NEUROLOGICAL: No headaches, memory loss, loss of strength, numbness, or tremors  SKIN: No itching, burning, rashes, or lesions   MUSCULOSKELETAL: No joint pain or swelling; No muscle, back, or extremity pain  PSYCHIATRIC: No depression, anxiety, mood swings, or difficulty sleeping      Medications:    aMIOdarone    Tablet 200 milliGRAM(s) Oral daily  carvedilol 25 milliGRAM(s) Oral two times a day  sacubitril 49 mG/valsartan 51 mG 1 Tablet(s) Oral two times a day  tamsulosin 0.4 milliGRAM(s) Oral at bedtime    albuterol/ipratropium for Nebulization 3 milliLiter(s) Nebulizer every 6 hours PRN    acetaminophen   Tablet .. 975 milliGRAM(s) Oral every 8 hours  HYDROmorphone  Injectable 0.5 milliGRAM(s) IV Push every 3 hours PRN  ketorolac   Injectable 30 milliGRAM(s) IV Push once PRN  melatonin 3 milliGRAM(s) Oral at bedtime  ondansetron Injectable 4 milliGRAM(s) IV Push once  oxyCODONE    IR 10 milliGRAM(s) Oral every 3 hours PRN  oxyCODONE    IR 5 milliGRAM(s) Oral every 3 hours PRN      apixaban 5 milliGRAM(s) Oral two times a day    bisacodyl Suppository 10 milliGRAM(s) Rectal once PRN  magnesium hydroxide Suspension 30 milliLiter(s) Oral daily PRN  pantoprazole    Tablet 40 milliGRAM(s) Oral before breakfast  polyethylene glycol 3350 17 Gram(s) Oral at bedtime  senna 2 Tablet(s) Oral at bedtime    oxybutynin 10 milliGRAM(s) Oral daily    atorvastatin 40 milliGRAM(s) Oral at bedtime  finasteride 5 milliGRAM(s) Oral daily      influenza   Vaccine 0.5 milliLiter(s) IntraMuscular once              ICU Vital Signs Last 24 Hrs  T(C): 36.4 (24 Mar 2021 20:00), Max: 37.3 (24 Mar 2021 16:01)  T(F): 97.6 (24 Mar 2021 20:00), Max: 99.1 (24 Mar 2021 16:01)  HR: 70 (24 Mar 2021 20:00) (63 - 73)  BP: 110/57 (24 Mar 2021 20:00) (107/56 - 139/77)  BP(mean): --  ABP: --  ABP(mean): --  RR: 18 (24 Mar 2021 20:00) (13 - 18)  SpO2: 94% (24 Mar 2021 20:00) (93% - 94%)          I&O's Detail    23 Mar 2021 07:01  -  24 Mar 2021 07:00  --------------------------------------------------------  IN:    Oral Fluid: 200 mL  Total IN: 200 mL    OUT:    Voided (mL): 800 mL  Total OUT: 800 mL    Total NET: -600 mL      24 Mar 2021 07:01  -  25 Mar 2021 00:09  --------------------------------------------------------  IN:  Total IN: 0 mL    OUT:    Voided (mL): 700 mL  Total OUT: 700 mL    Total NET: -700 mL            LABS:                        12.5   11.87 )-----------( 190      ( 24 Mar 2021 05:30 )             37.6     03-24    141  |  104  |  25<H>  ----------------------------<  111<H>  3.8   |  27  |  1.20    Ca    8.8      24 Mar 2021 05:30            CAPILLARY BLOOD GLUCOSE            CULTURES:      Physical Examination:    General: No acute distress.      HEENT: Pupils equal, reactive to light.  Symmetric.    PULM: Clear to auscultation bilaterally, no significant sputum production    NECK: Supple, no lymphadenopathy, trachea midline    CVS: Regular rate and rhythm, no murmurs, rubs, or gallops    ABD: Soft, nondistended, nontender, normoactive bowel sounds, no masses    EXT: No edema, nontender    SKIN: Warm and well perfused, no rashes noted.    NEURO: Alert, oriented, interactive, nonfocal

## 2021-03-25 NOTE — PROGRESS NOTE ADULT - ASSESSMENT
61 y/o male with pmhx obesiety, HLD, HTN, ERICK, pAfib on a/c, ischemic cardiomyopathy s/p ICD (LV function improved now) admitted on 3/22 for elective bilateral quadriceps tendon repair.     1. ERICK    -added supplemental O2 with improvement. patient needs sleep study outpatient vs pulm f/u for CPAP at home  -continue eliquis for a/c  -PT, incentive spirometry. no flexion

## 2021-03-25 NOTE — PROGRESS NOTE ADULT - ASSESSMENT
Patient is a 60y old  Male who presents with a chief complaint of bilateral quadriceps tendon rupture (25 Mar 2021 12:36)  pt POD #3 s/p b/l quadriceps tendon repair  no events noted over night  pt feels well, pain well managed    pt doing well from orthopaedic standpoint    Discuss plan with Dr Caro            PT/OT in bed for now           DVT prophylaxis with Eliquis            Absolutely no leg flexion           WBAT bilateral when patient becomes able to be OOB

## 2021-03-25 NOTE — PROGRESS NOTE ADULT - SUBJECTIVE AND OBJECTIVE BOX
Patient is a 60y old  Male who presents with a chief complaint of bilateral quadriceps rupture (25 Mar 2021 00:09)      24 HOUR EVENTS:  No overnight events reported.     SUBJECTIVE:  Patient seen and examined at bedside.     ALLERGIES:  No Known Allergies    MEDICATIONS  (STANDING):  acetaminophen   Tablet .. 975 milliGRAM(s) Oral every 8 hours  aMIOdarone    Tablet 200 milliGRAM(s) Oral daily  apixaban 5 milliGRAM(s) Oral two times a day  atorvastatin 40 milliGRAM(s) Oral at bedtime  carvedilol 25 milliGRAM(s) Oral two times a day  finasteride 5 milliGRAM(s) Oral daily  influenza   Vaccine 0.5 milliLiter(s) IntraMuscular once  melatonin 3 milliGRAM(s) Oral at bedtime  ondansetron Injectable 4 milliGRAM(s) IV Push once  oxybutynin 10 milliGRAM(s) Oral daily  pantoprazole    Tablet 40 milliGRAM(s) Oral before breakfast  polyethylene glycol 3350 17 Gram(s) Oral at bedtime  sacubitril 49 mG/valsartan 51 mG 1 Tablet(s) Oral two times a day  senna 2 Tablet(s) Oral at bedtime  tamsulosin 0.4 milliGRAM(s) Oral at bedtime    MEDICATIONS  (PRN):  albuterol/ipratropium for Nebulization 3 milliLiter(s) Nebulizer every 6 hours PRN Shortness of Breath and/or Wheezing  bisacodyl Suppository 10 milliGRAM(s) Rectal once PRN Constipation  HYDROmorphone  Injectable 0.5 milliGRAM(s) IV Push every 3 hours PRN Severe Pain (7 - 10)  ketorolac   Injectable 30 milliGRAM(s) IV Push once PRN Severe Pain (7 - 10)  magnesium hydroxide Suspension 30 milliLiter(s) Oral daily PRN Constipation  oxyCODONE    IR 10 milliGRAM(s) Oral every 3 hours PRN Severe Pain (7 - 10)  oxyCODONE    IR 5 milliGRAM(s) Oral every 3 hours PRN Moderate Pain (4 - 6)    Vital Signs Last 24 Hrs  T(F): 97.9 (25 Mar 2021 07:21), Max: 99.1 (24 Mar 2021 16:01)  HR: 62 (25 Mar 2021 07:21) (62 - 73)  BP: 132/70 (25 Mar 2021 07:21) (107/56 - 142/70)  RR: 16 (25 Mar 2021 07:21) (16 - 18)  SpO2: 93% (25 Mar 2021 07:21) (93% - 97%)  I&O's Summary    24 Mar 2021 07:01  -  25 Mar 2021 07:00  --------------------------------------------------------  IN: 0 mL / OUT: 700 mL / NET: -700 mL      PHYSICAL EXAM:  General: NAD, A/O x 3  ENT: Moist mucous membranes, no thrush  Neck: Supple, No JVD  Lungs: Clear to auscultation bilaterally, good air entry, non-labored breathing  Cardio: RRR, S1/S2, No murmur  Abdomen: Soft, Nontender, Nondistended; Bowel sounds present  Extremities: No calf tenderness, No pitting edema    LABS:                        11.7   7.58  )-----------( 173      ( 25 Mar 2021 07:30 )             35.2     03-25    143  |  108  |  26  ----------------------------<  115  3.8   |  28  |  1.10    Ca    8.6      25 Mar 2021 07:30          eGFR if Non African American: 72 mL/min/1.73M2 (03-25-21 @ 07:30)  eGFR if : 84 mL/min/1.73M2 (03-25-21 @ 07:30)                TSH 18.90   TSH with FT4 reflex --  Total T3 72                      RADIOLOGY & ADDITIONAL TESTS:    Care Discussed with Consultants/Other Providers:    Patient is a 60y old  Male who presents with a chief complaint of bilateral quadriceps rupture (25 Mar 2021 00:09)      24 HOUR EVENTS:  Overnight patient had oxygen desaturation due to sleep apnea. Recovered when awoken. Patient not "tolerating" CPAP machine.     SUBJECTIVE:  Patient seen and examined at bedside.   He does not want to leave the hospital today.  Says he still has not had a bowel movement yet.     ALLERGIES:  No Known Allergies    MEDICATIONS  (STANDING):  acetaminophen   Tablet .. 975 milliGRAM(s) Oral every 8 hours  aMIOdarone    Tablet 200 milliGRAM(s) Oral daily  apixaban 5 milliGRAM(s) Oral two times a day  atorvastatin 40 milliGRAM(s) Oral at bedtime  carvedilol 25 milliGRAM(s) Oral two times a day  finasteride 5 milliGRAM(s) Oral daily  influenza   Vaccine 0.5 milliLiter(s) IntraMuscular once  melatonin 3 milliGRAM(s) Oral at bedtime  ondansetron Injectable 4 milliGRAM(s) IV Push once  oxybutynin 10 milliGRAM(s) Oral daily  pantoprazole    Tablet 40 milliGRAM(s) Oral before breakfast  polyethylene glycol 3350 17 Gram(s) Oral at bedtime  sacubitril 49 mG/valsartan 51 mG 1 Tablet(s) Oral two times a day  senna 2 Tablet(s) Oral at bedtime  tamsulosin 0.4 milliGRAM(s) Oral at bedtime    MEDICATIONS  (PRN):  albuterol/ipratropium for Nebulization 3 milliLiter(s) Nebulizer every 6 hours PRN Shortness of Breath and/or Wheezing  bisacodyl Suppository 10 milliGRAM(s) Rectal once PRN Constipation  HYDROmorphone  Injectable 0.5 milliGRAM(s) IV Push every 3 hours PRN Severe Pain (7 - 10)  ketorolac   Injectable 30 milliGRAM(s) IV Push once PRN Severe Pain (7 - 10)  magnesium hydroxide Suspension 30 milliLiter(s) Oral daily PRN Constipation  oxyCODONE    IR 10 milliGRAM(s) Oral every 3 hours PRN Severe Pain (7 - 10)  oxyCODONE    IR 5 milliGRAM(s) Oral every 3 hours PRN Moderate Pain (4 - 6)    Vital Signs Last 24 Hrs  T(F): 97.9 (25 Mar 2021 07:21), Max: 99.1 (24 Mar 2021 16:01)  HR: 62 (25 Mar 2021 07:21) (62 - 73)  BP: 132/70 (25 Mar 2021 07:21) (107/56 - 142/70)  RR: 16 (25 Mar 2021 07:21) (16 - 18)  SpO2: 93% (25 Mar 2021 07:21) (93% - 97%)  I&O's Summary    24 Mar 2021 07:01  -  25 Mar 2021 07:00  --------------------------------------------------------  IN: 0 mL / OUT: 700 mL / NET: -700 mL      PHYSICAL EXAM:  General: morbidly obese, NAD, A/O x 3, lethargic/arousable  ENT: Moist mucous membranes, no thrush  Neck: Supple, No JVD  Lungs: Clear to auscultation bilaterally, good air entry, non-labored breathing, no wheezing. speaks in full sentences.   Cardio: RRR, S1/S2, No murmur  Abdomen: Soft, Nontender, Nondistended; Bowel sounds present  Extremities: No calf tenderness, No pitting edema. wearing b/l LE leg braces    LABS:                        11.7   7.58  )-----------( 173      ( 25 Mar 2021 07:30 )             35.2     03-25    143  |  108  |  26  ----------------------------<  115  3.8   |  28  |  1.10    Ca    8.6      25 Mar 2021 07:30          eGFR if Non African American: 72 mL/min/1.73M2 (03-25-21 @ 07:30)  eGFR if : 84 mL/min/1.73M2 (03-25-21 @ 07:30)                TSH 18.90   TSH with FT4 reflex --  Total T3 72                      RADIOLOGY & ADDITIONAL TESTS:    Care Discussed with Consultants/Other Providers:

## 2021-03-25 NOTE — PROGRESS NOTE ADULT - ASSESSMENT
60 year old morbidly obese male with  HTN, Afib on Eliquis, Cardiomyopathy (latest EF 53% - improved from 25%), s/p AICD, ERICK (cannot tolerate CPAP-noncompliant), HLD, BPH, arthritis, s/p fall 2/4/2021, discharged to rehab. After 2 weeks of rehab without improvement in gait, further work up was performed and he was found to have bilateral quadriceps tendon rupture. S/p repair on 3/22 with Orthopaedist Dr. Caro.    # acute hypoxic respiratory failure likely due to atelectasis/obesity  # ERICK w/ overnight desaturations  - suspect atelectasis. Able to wean O2 from 6L -->2 L just with incentive spirometry. Continue IS.   - CXR without acute abnormalities.  - Pt denies smoking hx.   - BNP mild elevation / ULN. He is euvolemic on exam.   - patient needs outpatient sleep study, CPAP at home. Reinforced compliance with CPAP machine.   - duonebs PRN    # Diastolic heart failure s/p AICD (Heart failure preserved ejection fraction improved)  # A fib  # HTN  appears euvolemic  - cont amiodarone, eliquis, entresto  - Cards also on consult. Recommend continued outpatient Cardiology f/u on discharge.     # BPH  - cont tamsulosin and finasteride    # B/l Quadriceps tendon rupture s/p repair 3/22  - f/u recs from Orthopaedics - strict bed rest for now.   - PT  - DVT ppx - on eliquis  - bowel regimen - recommend more aggressive in setting of constipation  - pain control  - no recent quinolone use.    # Biochemical hypothyroidism  - TSH elevated to 18.90, Total T3 low at 72, Free thyroxine LLN at 1.0.   - Endocrinology, Dr. Lau was consulted and will see the patient tomorrow. Recommended starting levothyroxine 50 mcg a day.  - f/u thyroid peroxidase antibodies    # Morbid obesity  - nutrition consult placed    Dispo: discussed case with Ivett from [place]. She said patient is bedrest, but can work legs and upper body. CM notified to work on TITUS placement.    60 year old morbidly obese male with  HTN, Afib on Eliquis, Cardiomyopathy (latest EF 53% - improved from 25%), s/p AICD, ERICK (cannot tolerate CPAP-noncompliant), HLD, BPH, arthritis, s/p fall 2/4/2021, discharged to rehab. After 2 weeks of rehab without improvement in gait, further work up was performed and he was found to have bilateral quadriceps tendon rupture. S/p repair on 3/22 with Orthopaedist Dr. Caro.    # acute hypoxic respiratory failure likely due to atelectasis/obesity  # ERICK w/ overnight desaturations  - suspect atelectasis. Able to wean O2 from 6L -->2 L just with incentive spirometry. Continue IS.   - CXR without acute abnormalities.  - Pt denies smoking hx.   - BNP mild elevation / ULN. He is euvolemic on exam.   - patient needs outpatient sleep study and he has no home CPAP machine - reportedly didn't tolerate study.   - duonebs PRN    # Diastolic heart failure s/p AICD (Heart failure preserved ejection fraction improved)  # A fib  # HTN  appears euvolemic  - cont amiodarone, eliquis, entresto  - Cards also on consult. Recommend continued outpatient Cardiology f/u on discharge.     # BPH  - cont tamsulosin and finasteride    # B/l Quadriceps tendon rupture s/p repair 3/22  - f/u recs from Orthopaedics - strict bed rest for now.   - PT  - DVT ppx - on eliquis  - bowel regimen - recommend more aggressive in setting of constipation  - pain control  - no recent quinolone use.    # Biochemical hypothyroidism  - TSH elevated to 18.90, Total T3 low at 72, Free thyroxine LLN at 1.0.   - Endocrinology, Dr. Lau was consulted and will see the patient tomorrow. Recommended starting levothyroxine 50 mcg a day.  - f/u thyroid peroxidase antibodies    # Morbid obesity  - nutrition consult placed    Dispo: discussed case with Ivett from [place]. She said patient is bedrest, but can work legs and upper body. CM notified to work on TITUS placement.

## 2021-03-25 NOTE — PROGRESS NOTE ADULT - SUBJECTIVE AND OBJECTIVE BOX
Patient is a 60y old  Male who presents with a chief complaint of bilateral quadriceps tendon rupture (25 Mar 2021 12:36)  pt POD #3 s/p b/l quadriceps tendon repair  no events noted over night  pt feels well    Patient seen and examined at bedside    ALLERGIES:  No Known Allergies    MEDICATIONS  (STANDING):  acetaminophen   Tablet .. 975 milliGRAM(s) Oral every 8 hours  aMIOdarone    Tablet 200 milliGRAM(s) Oral daily  apixaban 5 milliGRAM(s) Oral two times a day  atorvastatin 40 milliGRAM(s) Oral at bedtime  carvedilol 25 milliGRAM(s) Oral two times a day  finasteride 5 milliGRAM(s) Oral daily  influenza   Vaccine 0.5 milliLiter(s) IntraMuscular once  levothyroxine 50 MICROGram(s) Oral daily  melatonin 3 milliGRAM(s) Oral at bedtime  ondansetron Injectable 4 milliGRAM(s) IV Push once  oxybutynin 10 milliGRAM(s) Oral daily  pantoprazole    Tablet 40 milliGRAM(s) Oral before breakfast  polyethylene glycol 3350 17 Gram(s) Oral at bedtime  sacubitril 49 mG/valsartan 51 mG 1 Tablet(s) Oral two times a day  senna 2 Tablet(s) Oral at bedtime  tamsulosin 0.4 milliGRAM(s) Oral at bedtime    MEDICATIONS  (PRN):  albuterol/ipratropium for Nebulization 3 milliLiter(s) Nebulizer every 6 hours PRN Shortness of Breath and/or Wheezing  bisacodyl Suppository 10 milliGRAM(s) Rectal once PRN Constipation  HYDROmorphone  Injectable 0.5 milliGRAM(s) IV Push every 3 hours PRN Severe Pain (7 - 10)  ketorolac   Injectable 30 milliGRAM(s) IV Push once PRN Severe Pain (7 - 10)  magnesium hydroxide Suspension 30 milliLiter(s) Oral daily PRN Constipation  oxyCODONE    IR 10 milliGRAM(s) Oral every 3 hours PRN Severe Pain (7 - 10)  oxyCODONE    IR 5 milliGRAM(s) Oral every 3 hours PRN Moderate Pain (4 - 6)    Vital Signs Last 24 Hrs  T(F): 97.9 (25 Mar 2021 07:21), Max: 99.1 (24 Mar 2021 16:01)  HR: 62 (25 Mar 2021 07:21) (62 - 73)  BP: 132/70 (25 Mar 2021 07:21) (107/56 - 142/70)  RR: 16 (25 Mar 2021 07:21) (16 - 18)  SpO2: 93% (25 Mar 2021 07:21) (93% - 97%)    I&O's Summary    24 Mar 2021 07:01  -  25 Mar 2021 07:00  --------------------------------------------------------  IN: 0 mL / OUT: 700 mL / NET: -700 mL    PHYSICAL EXAM:  General: NAD, A/O x 3, resting comfortably in bed  ENT: MMM  Neck: Supple, No JVD  Abdomen: Soft, Nontender, Nondistended  Extremities: b/l Ran braces in place, locked in 0 degree flexion, sensation intact b/l, skin intact, warm and dry, DP 2+ b/l. L/L 5/5      LABS:                        11.7   7.58  )-----------( 173      ( 25 Mar 2021 07:30 )             35.2     03-25    143  |  108  |  26  ----------------------------<  115  3.8   |  28  |  1.10    Ca    8.6      25 Mar 2021 07:30    eGFR if Non African American: 72 mL/min/1.73M2 (03-25-21 @ 07:30)  eGFR if : 84 mL/min/1.73M2 (03-25-21 @ 07:30)

## 2021-03-26 LAB
ANION GAP SERPL CALC-SCNC: 7 MMOL/L — SIGNIFICANT CHANGE UP (ref 5–17)
BUN SERPL-MCNC: 22 MG/DL — SIGNIFICANT CHANGE UP (ref 7–23)
CALCIUM SERPL-MCNC: 8.6 MG/DL — SIGNIFICANT CHANGE UP (ref 8.4–10.5)
CHLORIDE SERPL-SCNC: 109 MMOL/L — HIGH (ref 96–108)
CO2 SERPL-SCNC: 27 MMOL/L — SIGNIFICANT CHANGE UP (ref 22–31)
CREAT SERPL-MCNC: 1.06 MG/DL — SIGNIFICANT CHANGE UP (ref 0.5–1.3)
GLUCOSE SERPL-MCNC: 119 MG/DL — HIGH (ref 70–99)
HCT VFR BLD CALC: 35.5 % — LOW (ref 39–50)
HGB BLD-MCNC: 11.7 G/DL — LOW (ref 13–17)
MCHC RBC-ENTMCNC: 32.1 PG — SIGNIFICANT CHANGE UP (ref 27–34)
MCHC RBC-ENTMCNC: 33 GM/DL — SIGNIFICANT CHANGE UP (ref 32–36)
MCV RBC AUTO: 97.3 FL — SIGNIFICANT CHANGE UP (ref 80–100)
NRBC # BLD: 0 /100 WBCS — SIGNIFICANT CHANGE UP (ref 0–0)
PLATELET # BLD AUTO: 173 K/UL — SIGNIFICANT CHANGE UP (ref 150–400)
POTASSIUM SERPL-MCNC: 3.9 MMOL/L — SIGNIFICANT CHANGE UP (ref 3.5–5.3)
POTASSIUM SERPL-SCNC: 3.9 MMOL/L — SIGNIFICANT CHANGE UP (ref 3.5–5.3)
RBC # BLD: 3.65 M/UL — LOW (ref 4.2–5.8)
RBC # FLD: 13.8 % — SIGNIFICANT CHANGE UP (ref 10.3–14.5)
SARS-COV-2 RNA SPEC QL NAA+PROBE: SIGNIFICANT CHANGE UP
SODIUM SERPL-SCNC: 143 MMOL/L — SIGNIFICANT CHANGE UP (ref 135–145)
THYROPEROXIDASE AB SERPL-ACNC: 433 IU/ML — HIGH
WBC # BLD: 6.85 K/UL — SIGNIFICANT CHANGE UP (ref 3.8–10.5)
WBC # FLD AUTO: 6.85 K/UL — SIGNIFICANT CHANGE UP (ref 3.8–10.5)

## 2021-03-26 PROCEDURE — 99232 SBSQ HOSP IP/OBS MODERATE 35: CPT

## 2021-03-26 RX ORDER — LEVOTHYROXINE SODIUM 125 MCG
100 TABLET ORAL DAILY
Refills: 0 | Status: DISCONTINUED | OUTPATIENT
Start: 2021-03-26 | End: 2021-04-02

## 2021-03-26 RX ADMIN — APIXABAN 5 MILLIGRAM(S): 2.5 TABLET, FILM COATED ORAL at 05:23

## 2021-03-26 RX ADMIN — ATORVASTATIN CALCIUM 40 MILLIGRAM(S): 80 TABLET, FILM COATED ORAL at 21:17

## 2021-03-26 RX ADMIN — Medication 975 MILLIGRAM(S): at 13:22

## 2021-03-26 RX ADMIN — Medication 3 MILLIGRAM(S): at 22:34

## 2021-03-26 RX ADMIN — SACUBITRIL AND VALSARTAN 1 TABLET(S): 24; 26 TABLET, FILM COATED ORAL at 17:15

## 2021-03-26 RX ADMIN — TAMSULOSIN HYDROCHLORIDE 0.4 MILLIGRAM(S): 0.4 CAPSULE ORAL at 21:19

## 2021-03-26 RX ADMIN — HYDROMORPHONE HYDROCHLORIDE 0.5 MILLIGRAM(S): 2 INJECTION INTRAMUSCULAR; INTRAVENOUS; SUBCUTANEOUS at 22:34

## 2021-03-26 RX ADMIN — Medication 50 MICROGRAM(S): at 05:22

## 2021-03-26 RX ADMIN — Medication 10 MILLIGRAM(S): at 12:26

## 2021-03-26 RX ADMIN — FINASTERIDE 5 MILLIGRAM(S): 5 TABLET, FILM COATED ORAL at 12:26

## 2021-03-26 RX ADMIN — SENNA PLUS 2 TABLET(S): 8.6 TABLET ORAL at 21:17

## 2021-03-26 RX ADMIN — PANTOPRAZOLE SODIUM 40 MILLIGRAM(S): 20 TABLET, DELAYED RELEASE ORAL at 05:22

## 2021-03-26 RX ADMIN — HYDROMORPHONE HYDROCHLORIDE 0.5 MILLIGRAM(S): 2 INJECTION INTRAMUSCULAR; INTRAVENOUS; SUBCUTANEOUS at 22:55

## 2021-03-26 RX ADMIN — POLYETHYLENE GLYCOL 3350 17 GRAM(S): 17 POWDER, FOR SOLUTION ORAL at 21:16

## 2021-03-26 RX ADMIN — CARVEDILOL PHOSPHATE 25 MILLIGRAM(S): 80 CAPSULE, EXTENDED RELEASE ORAL at 17:15

## 2021-03-26 RX ADMIN — CARVEDILOL PHOSPHATE 25 MILLIGRAM(S): 80 CAPSULE, EXTENDED RELEASE ORAL at 05:22

## 2021-03-26 RX ADMIN — SACUBITRIL AND VALSARTAN 1 TABLET(S): 24; 26 TABLET, FILM COATED ORAL at 06:00

## 2021-03-26 RX ADMIN — Medication 975 MILLIGRAM(S): at 22:18

## 2021-03-26 RX ADMIN — APIXABAN 5 MILLIGRAM(S): 2.5 TABLET, FILM COATED ORAL at 17:14

## 2021-03-26 RX ADMIN — AMIODARONE HYDROCHLORIDE 200 MILLIGRAM(S): 400 TABLET ORAL at 05:22

## 2021-03-26 RX ADMIN — Medication 975 MILLIGRAM(S): at 14:22

## 2021-03-26 RX ADMIN — Medication 975 MILLIGRAM(S): at 06:22

## 2021-03-26 RX ADMIN — Medication 975 MILLIGRAM(S): at 05:22

## 2021-03-26 RX ADMIN — Medication 975 MILLIGRAM(S): at 21:17

## 2021-03-26 NOTE — PROGRESS NOTE ADULT - ASSESSMENT
s/p orthopedic surgery, ruptured tendons  no current chf  ashd  hypothyroid, on therapy, elevated TSH  history of AF, on eliquis    suggest  current cardiac rx  may need increase synthroid

## 2021-03-26 NOTE — PROGRESS NOTE ADULT - SUBJECTIVE AND OBJECTIVE BOX
Patient is a 60y old  Male who presents with a chief complaint of bilateral quadriceps tendon rupture (25 Mar 2021 12:36)  pt POD #4 s/p b/l quadriceps tendon repair  no events noted over night  pt resting comfortably in bed  pt feels well     Vital Signs Last 24 Hrs  T(C): 37.2 (26 Mar 2021 10:00), Max: 37.2 (26 Mar 2021 10:00)  T(F): 98.9 (26 Mar 2021 10:00), Max: 98.9 (26 Mar 2021 10:00)  HR: 71 (26 Mar 2021 11:05) (61 - 71)  BP: 144/68 (26 Mar 2021 11:05) (115/74 - 152/81)  RR: 16 (26 Mar 2021 11:05) (14 - 16)  SpO2: 96% (26 Mar 2021 11:05) (94% - 98%)    PHYSICAL EXAM:  General: NAD, A/O x 3, resting comfortably in bed  ENT: MMM  Neck: Supple, No JVD  Abdomen: Soft, Nontender, Nondistended  Extremities: b/l [place] braces in place, locked in 0 degree flexion, sensation intact b/l, skin intact, warm and dry, DP 2+ b/l. EHL/FHL 5/5                          11.7   6.85  )-----------( 173      ( 26 Mar 2021 05:30 )             35.5     03-26    143  |  109<H>  |  22  ----------------------------<  119<H>  3.9   |  27  |  1.06    Ca    8.6      26 Mar 2021 05:30

## 2021-03-26 NOTE — PROGRESS NOTE ADULT - SUBJECTIVE AND OBJECTIVE BOX
Follow up for   orthopedic surgery history of CAD,    SUBJ:  no c/p sob    PMH  Chronic diastolic congestive heart failure    BPH (benign prostatic hyperplasia)    Morbid obesity    HLD (hyperlipidemia)    Effusion of both knee joints    HTN (hypertension)    ERICK (obstructive sleep apnea)    Syncope    Cardiomyopathy    Paroxysmal A-fib    CAD (coronary artery disease)        MEDICATIONS  (STANDING):  acetaminophen   Tablet .. 975 milliGRAM(s) Oral every 8 hours  aMIOdarone    Tablet 200 milliGRAM(s) Oral daily  apixaban 5 milliGRAM(s) Oral two times a day  atorvastatin 40 milliGRAM(s) Oral at bedtime  carvedilol 25 milliGRAM(s) Oral two times a day  finasteride 5 milliGRAM(s) Oral daily  influenza   Vaccine 0.5 milliLiter(s) IntraMuscular once  levothyroxine 50 MICROGram(s) Oral daily  melatonin 3 milliGRAM(s) Oral at bedtime  ondansetron Injectable 4 milliGRAM(s) IV Push once  oxybutynin 10 milliGRAM(s) Oral daily  pantoprazole    Tablet 40 milliGRAM(s) Oral before breakfast  polyethylene glycol 3350 17 Gram(s) Oral at bedtime  sacubitril 49 mG/valsartan 51 mG 1 Tablet(s) Oral two times a day  senna 2 Tablet(s) Oral at bedtime  tamsulosin 0.4 milliGRAM(s) Oral at bedtime    MEDICATIONS  (PRN):  albuterol/ipratropium for Nebulization 3 milliLiter(s) Nebulizer every 6 hours PRN Shortness of Breath and/or Wheezing  bisacodyl Suppository 10 milliGRAM(s) Rectal once PRN Constipation  HYDROmorphone  Injectable 0.5 milliGRAM(s) IV Push every 3 hours PRN Severe Pain (7 - 10)  ketorolac   Injectable 30 milliGRAM(s) IV Push once PRN Severe Pain (7 - 10)  magnesium hydroxide Suspension 30 milliLiter(s) Oral daily PRN Constipation  oxyCODONE    IR 10 milliGRAM(s) Oral every 3 hours PRN Severe Pain (7 - 10)  oxyCODONE    IR 5 milliGRAM(s) Oral every 3 hours PRN Moderate Pain (4 - 6)        PHYSICAL EXAM:  Vital Signs Last 24 Hrs  T(C): 36.6 (26 Mar 2021 05:48), Max: 36.9 (25 Mar 2021 15:54)  T(F): 97.8 (26 Mar 2021 05:48), Max: 98.4 (25 Mar 2021 15:54)  HR: 69 (26 Mar 2021 05:48) (65 - 69)  BP: 143/78 (26 Mar 2021 05:48) (115/74 - 152/81)  BP(mean): --  RR: 16 (26 Mar 2021 05:48) (16 - 16)  SpO2: 98% (26 Mar 2021 05:48) (94% - 98%)    GENERAL: NAD, well-groomed, well-developed  HEAD:  Atraumatic, Normocephalic  EYES:  conjunctiva and sclera clear  NECK: Supple, No JVD, no bruits  CHEST/LUNG: Clear to auscultation bilaterally; No rales, rhonchi, wheezing, or rubs  HEART: Regular rate and rhythm; No murmurs, rubs, or gallops PMI non displaced.  ABDOMEN: Soft, Nontender, Nondistended; Bowel sounds present  EXTREMITIES:  , No clubbing, cyanosis, or edema  SKIN: No rashes or lesions  NERVOUS SYSTEM:  Alert       TELEMETRY:  sinus    ECG:    LABS:                        11.7   6.85  )-----------( 173      ( 26 Mar 2021 05:30 )             35.5     03-26    143  |  109<H>  |  22  ----------------------------<  119<H>  3.9   |  27  |  1.06    Ca    8.6      26 Mar 2021 05:30      I&O's Summary    25 Mar 2021 07:01  -  26 Mar 2021 07:00  --------------------------------------------------------  IN: 200 mL / OUT: 1900 mL / NET: -1700 mL    RADIOLOGY & ADDITIONAL STUDIES:< from: Xray Chest 1 View- PORTABLE-Routine (Xray Chest 1 View- PORTABLE-Routine in AM.) (03.23.21 @ 09:47) >    EXAM:  XR CHEST PORTABLE ROUTINE 1V      PROCEDURE DATE:  03/23/2021        INTERPRETATION:  Portable chest x-ray    Clinical Indication: History of cardiomyopathy. Rule out CHF    Comparison: None    Portable chest x-ray is acquired for evaluation.    Impression: No evidence for acute pulmonary infiltrate, pleural effusion, or pneumothorax.    The trachea is midline.    The cardiac silhouette is within normal limits.    No pulmonary vascular congestion.    Left cardiac device is present. Another electronic device projecting over the left lung base.    < end of copied text >

## 2021-03-26 NOTE — PROGRESS NOTE ADULT - ASSESSMENT
Patient is a 60y old  Male who presents with a chief complaint of bilateral quadriceps tendon rupture (25 Mar 2021 12:36)  pt POD # 4 s/p b/l quadriceps tendon repair  no events noted over night  pt feels well, pain well managed    pt doing well from orthopaedic standpoint    Discuss plan with Dr Caro            PT/OT in bed for now           DVT prophylaxis with Eliquis            Absolutely no leg flexion           WBAT bilateral when patient becomes able to be OOB

## 2021-03-26 NOTE — PROGRESS NOTE ADULT - SUBJECTIVE AND OBJECTIVE BOX
F/U Note:    60M POD #4 from B/L quadriceps repair, S/P rupture    Interval hx:  -patient comfortable in bed, states he worked with PT while in bed today, endorses NO pain.     ROS:   patient offers no complaints at this time  Physical Exam:    NEURO: Awake/alert  CV: (+) S1/S2, irregularly irregular, no MRG   RESp: CTA bl  GI: soft, non tender  B/L leg immobilizers in place         LABS:                          11.7   6.85  )-----------( 173      ( 26 Mar 2021 05:30 )             35.5         03-26    143  |  109<H>  |  22  ----------------------------<  119<H>  3.9   |  27  |  1.06    Ca    8.6      26 Mar 2021 05:30              PLAN:    -PTO in bed  -per ortho note absolutely NO flexion, patient with leg immobilizers in place to prevent  -pain control  -serial exams  -eliquis for DVT prophylaxis and afib tx  -f/u Am labs

## 2021-03-26 NOTE — PROGRESS NOTE ADULT - SUBJECTIVE AND OBJECTIVE BOX
Patient is a 60y old  Male who presents with a chief complaint of bilateral quadriceps rupture (26 Mar 2021 10:43)      Patient seen and examined at bedside.  No overnight events  No complaints this morning    ALLERGIES:  No Known Allergies    MEDICATIONS  (STANDING):  acetaminophen   Tablet .. 975 milliGRAM(s) Oral every 8 hours  aMIOdarone    Tablet 200 milliGRAM(s) Oral daily  apixaban 5 milliGRAM(s) Oral two times a day  atorvastatin 40 milliGRAM(s) Oral at bedtime  carvedilol 25 milliGRAM(s) Oral two times a day  finasteride 5 milliGRAM(s) Oral daily  influenza   Vaccine 0.5 milliLiter(s) IntraMuscular once  levothyroxine 100 MICROGram(s) Oral daily  melatonin 3 milliGRAM(s) Oral at bedtime  ondansetron Injectable 4 milliGRAM(s) IV Push once  oxybutynin 10 milliGRAM(s) Oral daily  pantoprazole    Tablet 40 milliGRAM(s) Oral before breakfast  polyethylene glycol 3350 17 Gram(s) Oral at bedtime  sacubitril 49 mG/valsartan 51 mG 1 Tablet(s) Oral two times a day  senna 2 Tablet(s) Oral at bedtime  tamsulosin 0.4 milliGRAM(s) Oral at bedtime    MEDICATIONS  (PRN):  albuterol/ipratropium for Nebulization 3 milliLiter(s) Nebulizer every 6 hours PRN Shortness of Breath and/or Wheezing  bisacodyl Suppository 10 milliGRAM(s) Rectal once PRN Constipation  HYDROmorphone  Injectable 0.5 milliGRAM(s) IV Push every 3 hours PRN Severe Pain (7 - 10)  ketorolac   Injectable 30 milliGRAM(s) IV Push once PRN Severe Pain (7 - 10)  magnesium hydroxide Suspension 30 milliLiter(s) Oral daily PRN Constipation  oxyCODONE    IR 10 milliGRAM(s) Oral every 3 hours PRN Severe Pain (7 - 10)  oxyCODONE    IR 5 milliGRAM(s) Oral every 3 hours PRN Moderate Pain (4 - 6)    Vital Signs Last 24 Hrs  T(F): 98.9 (26 Mar 2021 10:00), Max: 98.9 (26 Mar 2021 10:00)  HR: 71 (26 Mar 2021 11:05) (61 - 71)  BP: 144/68 (26 Mar 2021 11:05) (115/74 - 152/81)  RR: 16 (26 Mar 2021 11:05) (14 - 16)  SpO2: 96% (26 Mar 2021 11:05) (94% - 98%)  I&O's Summary    25 Mar 2021 07:01  -  26 Mar 2021 07:00  --------------------------------------------------------  IN: 200 mL / OUT: 1900 mL / NET: -1700 mL    26 Mar 2021 07:01  -  26 Mar 2021 13:36  --------------------------------------------------------  IN: 200 mL / OUT: 0 mL / NET: 200 mL      BMI (kg/m2): 41.5 (03-22-21 @ 22:00)    PHYSICAL EXAM:  GENERAL: NAD  HENT:  Atraumatic, Normocephalic; No tonsillar erythema, exudates, or enlargement; Moist mucous membranes;   EYES: EOMI, PERRLA, conjunctiva and sclera clear, no lid-lag  NECK: Supple, No JVD, Normal thyroid  CHEST/LUNG: Clear to percussion bilaterally; No rales, rhonchi, wheezing, or rubs; normal respiratory effort, no intercostal retractions  HEART: Regular rate and rhythm; No murmurs, rubs, or gallops  ABDOMEN: Soft, Nontender, Nondistended; Bowel sounds present; No HSM  MUSCULOSKELETAL/EXTREMITIES:  2+ Peripheral Pulses, No clubbing, cyanosis, or peripheral edema; No digital cyanosis  ROM (pain, crepitation or contracture)  PSYCH: Appropriate affect, Alert & Oriented x 3    LABS:                        11.7   6.85  )-----------( 173      ( 26 Mar 2021 05:30 )             35.5       03-26    143  |  109  |  22  ----------------------------<  119  3.9   |  27  |  1.06    Ca    8.6      26 Mar 2021 05:30       eGFR if Non African American: 75 mL/min/1.73M2 (03-26-21 @ 05:30)  eGFR if African American: 87 mL/min/1.73M2 (03-26-21 @ 05:30)       TSH 18.90   TSH with FT4 reflex --  Total T3 72      Care Discussed with Consultants/Other Providers: Yes

## 2021-03-26 NOTE — PROGRESS NOTE ADULT - ASSESSMENT
60 year old morbidly obese male with  HTN, Afib on Eliquis, Cardiomyopathy (latest EF 53% - improved from 25%), s/p AICD, ERICK (cannot tolerate CPAP-noncompliant), HLD, BPH, arthritis, s/p fall 2/4/2021, discharged to rehab. After 2 weeks of rehab without improvement in gait, further work up was performed and he was found to have bilateral quadriceps tendon rupture. S/p repair on 3/22 with Orthopaedist Dr. Caro.    # acute hypoxic respiratory failure likely due to atelectasis/obesity  # ERICK w/ overnight desaturations  - suspect atelectasis. Able to wean O2 from 6L -->2 L just with incentive spirometry. Continue IS.   - CXR without acute abnormalities.  - Pt denies smoking hx.   - BNP mild elevation / ULN. He is euvolemic on exam.   - patient needs outpatient sleep study and he has no home CPAP machine - reportedly didn't tolerate study.   - duonebs PRN    # Diastolic heart failure s/p AICD (Heart failure preserved ejection fraction improved) - chronic  # A fib - chronic  # HTN  - appears euvolemic  - cont amiodarone, eliquis, entresto  - Cards also on consult. Recommend continued outpatient Cardiology f/u on discharge.     # BPH  - cont tamsulosin and finasteride    # B/l Quadriceps tendon rupture s/p repair 3/22  - Ortho: PT/OT in bed for now; Absolutely no leg flexion (6 weeks total); WBAT bilateral when patient becomes able to be OOB   - DVT ppx - on eliquis  - PT eval ordered  - bowel regimen - recommend more aggressive in setting of constipation  - pain control  - no recent quinolone use.  - TITUS placement    # Biochemical hypothyroidism  - TSH elevated to 18.90, Total T3 low at 72, Free thyroxine LLN at 1.0.   - Endocrinology, Dr. Lau was consulted, On levothyroxine 100 mcg a day.  - f/u thyroid peroxidase antibodies  - As per cardio, titrate up on synthroid slowly since significant heart disease    # Morbid obesity  - nutrition consult placed    Dispo: discussed case with Ivett from [place]. She said patient is bedrest, but can work legs and upper body. CM notified to work on TITUS placement.   3/26 - attempted to contact emergency contact Marine villalba, phone kept ringing. Will try again

## 2021-03-27 PROCEDURE — 99232 SBSQ HOSP IP/OBS MODERATE 35: CPT

## 2021-03-27 RX ADMIN — Medication 975 MILLIGRAM(S): at 13:01

## 2021-03-27 RX ADMIN — Medication 975 MILLIGRAM(S): at 22:18

## 2021-03-27 RX ADMIN — CARVEDILOL PHOSPHATE 25 MILLIGRAM(S): 80 CAPSULE, EXTENDED RELEASE ORAL at 17:59

## 2021-03-27 RX ADMIN — SACUBITRIL AND VALSARTAN 1 TABLET(S): 24; 26 TABLET, FILM COATED ORAL at 05:39

## 2021-03-27 RX ADMIN — APIXABAN 5 MILLIGRAM(S): 2.5 TABLET, FILM COATED ORAL at 05:37

## 2021-03-27 RX ADMIN — FINASTERIDE 5 MILLIGRAM(S): 5 TABLET, FILM COATED ORAL at 12:58

## 2021-03-27 RX ADMIN — Medication 100 MICROGRAM(S): at 05:37

## 2021-03-27 RX ADMIN — SACUBITRIL AND VALSARTAN 1 TABLET(S): 24; 26 TABLET, FILM COATED ORAL at 17:59

## 2021-03-27 RX ADMIN — HYDROMORPHONE HYDROCHLORIDE 0.5 MILLIGRAM(S): 2 INJECTION INTRAMUSCULAR; INTRAVENOUS; SUBCUTANEOUS at 22:08

## 2021-03-27 RX ADMIN — PANTOPRAZOLE SODIUM 40 MILLIGRAM(S): 20 TABLET, DELAYED RELEASE ORAL at 05:40

## 2021-03-27 RX ADMIN — Medication 975 MILLIGRAM(S): at 06:38

## 2021-03-27 RX ADMIN — TAMSULOSIN HYDROCHLORIDE 0.4 MILLIGRAM(S): 0.4 CAPSULE ORAL at 21:19

## 2021-03-27 RX ADMIN — Medication 975 MILLIGRAM(S): at 21:18

## 2021-03-27 RX ADMIN — ATORVASTATIN CALCIUM 40 MILLIGRAM(S): 80 TABLET, FILM COATED ORAL at 21:18

## 2021-03-27 RX ADMIN — Medication 10 MILLIGRAM(S): at 12:58

## 2021-03-27 RX ADMIN — AMIODARONE HYDROCHLORIDE 200 MILLIGRAM(S): 400 TABLET ORAL at 05:37

## 2021-03-27 RX ADMIN — Medication 975 MILLIGRAM(S): at 05:36

## 2021-03-27 RX ADMIN — HYDROMORPHONE HYDROCHLORIDE 0.5 MILLIGRAM(S): 2 INJECTION INTRAMUSCULAR; INTRAVENOUS; SUBCUTANEOUS at 21:54

## 2021-03-27 RX ADMIN — Medication 975 MILLIGRAM(S): at 14:00

## 2021-03-27 RX ADMIN — APIXABAN 5 MILLIGRAM(S): 2.5 TABLET, FILM COATED ORAL at 17:59

## 2021-03-27 RX ADMIN — Medication 3 MILLIGRAM(S): at 21:54

## 2021-03-27 RX ADMIN — Medication 10 MILLIGRAM(S): at 13:18

## 2021-03-27 RX ADMIN — CARVEDILOL PHOSPHATE 25 MILLIGRAM(S): 80 CAPSULE, EXTENDED RELEASE ORAL at 05:37

## 2021-03-27 NOTE — PROGRESS NOTE ADULT - ASSESSMENT
60 year old with hx of cardimyapathy , ERICK without C-PAP ,  CAD with stents, AICD,  htn, morbid obesity , BPH, and now hypothyroid is ortho stable   but awaiting authorization for [facility] .  Patient has been at bedrest until feeling stronger to be able to stand with PT.   Although patient is able to stand, Both legs are locked in extension for 6 weeks.   This situation has made mobility very difficult for patient 2/2 to body habitus.   Patient feels that each day he is   becoming more confident in mobility .       Plan:   Will redress surgical incisions prior to discharge to [facility] for DVT prophylaxis and A Fib / stents             Continue bed PT until patient cleared by Dr Caro              As per medicine             [facility] - awaiting auth  60 year old with hx of cardimyapathy , ERICK without C-PAP ,  CAD with stents, AICD,  htn, morbid obesity , BPH, and now hypothyroid is ortho stable   but awaiting authorization for [facility] .  Patient has been at bedrest until feeling stronger to be able to stand with PT.   Although patient is able to stand, Both legs are locked in extension for 6 weeks.   This situation has made mobility very difficult for patient 2/2 to body habitus.   Patient feels that each day he is   becoming more confident in mobility .       Plan:   Will redress surgical incisions prior to discharge to [facility] for DVT prophylaxis and A Fib / stents             Continue bed PT until patient cleared by Dr Caro               Laxatives for constipation             As per medicine             [facility] - awaiting auth

## 2021-03-27 NOTE — PROGRESS NOTE ADULT - SUBJECTIVE AND OBJECTIVE BOX
Patient is a 60y old  Male who presents with a chief complaint of bilateral quadriceps rupture (26 Mar 2021 19:40)      Patient seen and examined at bedside.  No overnight events  No complaints this morning  Awaiting to go to [facility]    ALLERGIES:  No Known Allergies    MEDICATIONS  (STANDING):  acetaminophen   Tablet .. 975 milliGRAM(s) Oral every 8 hours  aMIOdarone    Tablet 200 milliGRAM(s) Oral daily  apixaban 5 milliGRAM(s) Oral two times a day  atorvastatin 40 milliGRAM(s) Oral at bedtime  carvedilol 25 milliGRAM(s) Oral two times a day  finasteride 5 milliGRAM(s) Oral daily  influenza   Vaccine 0.5 milliLiter(s) IntraMuscular once  levothyroxine 100 MICROGram(s) Oral daily  melatonin 3 milliGRAM(s) Oral at bedtime  ondansetron Injectable 4 milliGRAM(s) IV Push once  oxybutynin 10 milliGRAM(s) Oral daily  pantoprazole    Tablet 40 milliGRAM(s) Oral before breakfast  polyethylene glycol 3350 17 Gram(s) Oral at bedtime  sacubitril 49 mG/valsartan 51 mG 1 Tablet(s) Oral two times a day  senna 2 Tablet(s) Oral at bedtime  tamsulosin 0.4 milliGRAM(s) Oral at bedtime    MEDICATIONS  (PRN):  albuterol/ipratropium for Nebulization 3 milliLiter(s) Nebulizer every 6 hours PRN Shortness of Breath and/or Wheezing  bisacodyl Suppository 10 milliGRAM(s) Rectal once PRN Constipation  HYDROmorphone  Injectable 0.5 milliGRAM(s) IV Push every 3 hours PRN Severe Pain (7 - 10)  ketorolac   Injectable 30 milliGRAM(s) IV Push once PRN Severe Pain (7 - 10)  magnesium hydroxide Suspension 30 milliLiter(s) Oral daily PRN Constipation  oxyCODONE    IR 10 milliGRAM(s) Oral every 3 hours PRN Severe Pain (7 - 10)  oxyCODONE    IR 5 milliGRAM(s) Oral every 3 hours PRN Moderate Pain (4 - 6)    Vital Signs Last 24 Hrs  T(F): 98.6 (27 Mar 2021 05:01), Max: 98.9 (26 Mar 2021 10:00)  HR: 64 (27 Mar 2021 05:01) (61 - 71)  BP: 150/71 (27 Mar 2021 05:01) (135/62 - 150/71)  RR: 17 (27 Mar 2021 05:01) (14 - 17)  SpO2: 95% (27 Mar 2021 05:01) (94% - 96%)  I&O's Summary    26 Mar 2021 07:01  -  27 Mar 2021 07:00  --------------------------------------------------------  IN: 400 mL / OUT: 1200 mL / NET: -800 mL      BMI (kg/m2): 41.5 (03-22-21 @ 22:00)    PHYSICAL EXAM:  GENERAL: NAD, morbidly obese  HENT:  Atraumatic, Normocephalic; No tonsillar erythema, exudates, or enlargement; Moist mucous membranes;   EYES: EOMI, PERRLA, conjunctiva and sclera clear, no lid-lag  NECK: Supple, No JVD, Normal thyroid  CHEST/LUNG: Clear to percussion bilaterally; No rales, rhonchi, wheezing, or rubs; normal respiratory effort, no intercostal retractions  HEART: Regular rate and rhythm; No murmurs, rubs, or gallops  ABDOMEN: Soft, Nontender, obese abdomen; Bowel sounds present; No HSM  MUSCULOSKELETAL/EXTREMITIES:  2+ Peripheral Pulses, No clubbing, cyanosis, or peripheral edema; No digital cyanosis  PSYCH: Appropriate affect, Alert & Oriented x 3    LABS:                        11.7   6.85  )-----------( 173      ( 26 Mar 2021 05:30 )             35.5       03-26    143  |  109  |  22  ----------------------------<  119  3.9   |  27  |  1.06    Ca    8.6      26 Mar 2021 05:30       eGFR if Non African American: 75 mL/min/1.73M2 (03-26-21 @ 05:30)  eGFR if African American: 87 mL/min/1.73M2 (03-26-21 @ 05:30)     Care Discussed with Consultants/Other Providers: Yes

## 2021-03-27 NOTE — PROGRESS NOTE ADULT - ASSESSMENT
h60 year old morbidly obese male with  HTN, Afib on Eliquis, Cardiomyopathy (latest EF 53% - improved from 25%), s/p AICD, ERICK (cannot tolerate CPAP-noncompliant), HLD, BPH, arthritis, s/p fall 2/4/2021, discharged to rehab. After 2 weeks of rehab without improvement in gait, further work up was performed and he was found to have bilateral quadriceps tendon rupture. S/p repair on 3/22 with Orthopaedist Dr. Caro.    # acute hypoxic respiratory failure likely due to atelectasis/obesity  # ERICK w/ overnight desaturations  - suspect atelectasis. Able to wean O2 from 6L -->2 L just with incentive spirometry. Continue IS.   - CXR without acute abnormalities.  - Pt denies smoking hx.   - BNP mild elevation / ULN. He is euvolemic on exam.   - patient needs outpatient sleep study and he has no home CPAP machine - reportedly didn't tolerate study.   - duonebs PRN    # Diastolic heart failure s/p AICD (Heart failure preserved ejection fraction improved) - chronic  # A fib - chronic  # HTN  - appears euvolemic  - cont amiodarone, eliquis, entresto  - Cards also on consult. Recommend continued outpatient Cardiology f/u on discharge.     # BPH  - cont tamsulosin and finasteride    # B/l Quadriceps tendon rupture s/p repair 3/22  - Ortho: PT/OT in bed for now; Absolutely no leg flexion (6 weeks total); WBAT bilateral when patient becomes able to be OOB   - DVT ppx - on eliquis  - PT eval ordered - TITUS  - bowel regimen - recommend more aggressive in setting of constipation  - pain control  - no recent quinolone use.    # Biochemical hypothyroidism  - TSH elevated to 18.90, Total T3 low at 72, Free thyroxine LLN at 1.0.   - Endocrinology, Dr. Lau was consulted, On levothyroxine 100 mcg a day.  - f/u thyroid peroxidase antibodies  - As per cardio, titrate up on synthroid slowly since significant heart disease    # Morbid obesity  - nutrition consult placed    Dispo: awaiting auth

## 2021-03-27 NOTE — PROGRESS NOTE ADULT - SUBJECTIVE AND OBJECTIVE BOX
F/U Note:    60M POD #5 from B/L quadriceps repair, S/P rupture    Interval hx:  -patient comfortable in bed, states he worked with PT while in bed today, endorses NO pain.     ROS:   patient offers no complaints at this time  Physical Exam:    NEURO: Awake/alert  CV: (+) S1/S2, irregularly irregular, no MRG   RESp: CTA bl  GI: soft, non tender  B/L leg immobilizers in place     LABS:                          11.7   6.85  )-----------( 173      ( 26 Mar 2021 05:30 )             35.5         03-26    143  |  109<H>  |  22  ----------------------------<  119<H>  3.9   |  27  |  1.06    Ca    8.6      26 Mar 2021 05:30        ICU Vital Signs Last 24 Hrs  T(C): 37 (28 Mar 2021 00:04), Max: 37 (27 Mar 2021 05:01)  T(F): 98.6 (28 Mar 2021 00:04), Max: 98.6 (27 Mar 2021 05:01)  HR: 63 (28 Mar 2021 00:04) (63 - 73)  BP: 152/83 (28 Mar 2021 00:04) (108/66 - 152/83)  RR: 17 (28 Mar 2021 00:04) (17 - 18)  SpO2: 95% (28 Mar 2021 00:04) (92% - 95%)]        PLAN:    -PTO in bed  -per ortho note absolutely NO flexion, patient with leg immobilizers in place to prevent  -pain control  -serial exams  -eliquis for DVT prophylaxis and afib tx  -f/u Am labs

## 2021-03-27 NOTE — PROGRESS NOTE ADULT - SUBJECTIVE AND OBJECTIVE BOX
S/P Bilateral Quadriceps Repair   POD #5    Patient was seen and examined by me this am.    There were no acute events noted overnight.  Patient is resting comfortably this am.   He has no new   complaints.   His pain is manageable , he is becoming more mobile in bed.   He denies CP or SOB.      Vital Signs Last 24 Hrs  T(C): 36.9 (27 Mar 2021 09:53), Max: 37 (27 Mar 2021 05:01)  T(F): 98.4 (27 Mar 2021 09:53), Max: 98.6 (27 Mar 2021 05:01)  HR: 63 (27 Mar 2021 09:53) (63 - 64)  BP: 125/68 (27 Mar 2021 09:53) (125/68 - 150/71)  BP(mean): 80 (26 Mar 2021 17:18) (80 - 80)  RR: 17 (27 Mar 2021 09:53) (16 - 17)  SpO2: 92% (27 Mar 2021 09:53) (92% - 95%)    PAST MEDICAL & SURGICAL HISTORY:  Chronic diastolic congestive heart failure  BPH (benign prostatic hyperplasia)  Morbid obesity  HLD (hyperlipidemia)  Effusion of both knee joints  HTN (hypertension)  ERICK (obstructive sleep apnea)  Syncope  Cardiomyopathy  Paroxysmal A-fib  CAD (coronary artery disease)  History of appendectomy  Acute DVT (deep venous thrombosis)  Biventricular ICD (implantable cardioverter-defibrillator) in place  Coronary stent patent    MEDICATIONS  (STANDING):  acetaminophen   Tablet .. 975 milliGRAM(s) Oral every 8 hours  aMIOdarone    Tablet 200 milliGRAM(s) Oral daily  apixaban 5 milliGRAM(s) Oral two times a day  atorvastatin 40 milliGRAM(s) Oral at bedtime  carvedilol 25 milliGRAM(s) Oral two times a day  finasteride 5 milliGRAM(s) Oral daily  influenza   Vaccine 0.5 milliLiter(s) IntraMuscular once  levothyroxine 100 MICROGram(s) Oral daily  melatonin 3 milliGRAM(s) Oral at bedtime  ondansetron Injectable 4 milliGRAM(s) IV Push once  oxybutynin 10 milliGRAM(s) Oral daily  pantoprazole    Tablet 40 milliGRAM(s) Oral before breakfast  polyethylene glycol 3350 17 Gram(s) Oral at bedtime  sacubitril 49 mG/valsartan 51 mG 1 Tablet(s) Oral two times a day  senna 2 Tablet(s) Oral at bedtime  tamsulosin 0.4 milliGRAM(s) Oral at bedtime    MEDICATIONS  (PRN):  albuterol/ipratropium for Nebulization 3 milliLiter(s) Nebulizer every 6 hours PRN Shortness of Breath and/or Wheezing  bisacodyl Suppository 10 milliGRAM(s) Rectal once PRN Constipation  HYDROmorphone  Injectable 0.5 milliGRAM(s) IV Push every 3 hours PRN Severe Pain (7 - 10)  ketorolac   Injectable 30 milliGRAM(s) IV Push once PRN Severe Pain (7 - 10)  magnesium hydroxide Suspension 30 milliLiter(s) Oral daily PRN Constipation  oxyCODONE    IR 10 milliGRAM(s) Oral every 3 hours PRN Severe Pain (7 - 10)  oxyCODONE    IR 5 milliGRAM(s) Oral every 3 hours PRN Moderate Pain (4 - 6)    PE:  Alert and oriented X 4  Neck:  w/o JVD   Lungs:  CTA   Cor:  distant heart sounds S1S2  Abd:   obese, +BS-BM  voiding adequately   Ext:    Both legs locked in extension with Bilateral [place] braces.   First post op dressing still in place.  They are clean , dry and completely intact.    Patient is experiencing some itching probably from web roll.  Feet warm, no skin breakdown, + neurovascular intact   Sacrum evaluated by nursing no skin breakdown noted.                            11.7   6.85  )-----------( 173      ( 26 Mar 2021 05:30 )             35.5   03-26    143  |  109<H>  |  22  ----------------------------<  119<H>  3.9   |  27  |  1.06    Ca    8.6      26 Mar 2021 05:30    Thyroid Stimulating Hormone, Serum: 18.90 uIU/mL (03.24.21 @ 05:30)    Thyroperoxidase Antibody: 433.0 IU/mL (03.25.21 @ 07:30)    Free Thyroxine, Serum: 1.0 ng/dL (03.24.21 @ 05:30)         S/P Bilateral Quadriceps Repair   POD #5    Patient was seen and examined by me this am.    There were no acute events noted overnight.  Patient is resting comfortably this am.   He has no new   complaints except constipation.   His pain is manageable , he is becoming more mobile in bed.   He denies CP or SOB.      Vital Signs Last 24 Hrs  T(C): 36.9 (27 Mar 2021 09:53), Max: 37 (27 Mar 2021 05:01)  T(F): 98.4 (27 Mar 2021 09:53), Max: 98.6 (27 Mar 2021 05:01)  HR: 63 (27 Mar 2021 09:53) (63 - 64)  BP: 125/68 (27 Mar 2021 09:53) (125/68 - 150/71)  BP(mean): 80 (26 Mar 2021 17:18) (80 - 80)  RR: 17 (27 Mar 2021 09:53) (16 - 17)  SpO2: 92% (27 Mar 2021 09:53) (92% - 95%)    PAST MEDICAL & SURGICAL HISTORY:  Chronic diastolic congestive heart failure  BPH (benign prostatic hyperplasia)  Morbid obesity  HLD (hyperlipidemia)  Effusion of both knee joints  HTN (hypertension)  ERICK (obstructive sleep apnea)  Syncope  Cardiomyopathy  Paroxysmal A-fib  CAD (coronary artery disease)  History of appendectomy  Acute DVT (deep venous thrombosis)  Biventricular ICD (implantable cardioverter-defibrillator) in place  Coronary stent patent    MEDICATIONS  (STANDING):  acetaminophen   Tablet .. 975 milliGRAM(s) Oral every 8 hours  aMIOdarone    Tablet 200 milliGRAM(s) Oral daily  apixaban 5 milliGRAM(s) Oral two times a day  atorvastatin 40 milliGRAM(s) Oral at bedtime  carvedilol 25 milliGRAM(s) Oral two times a day  finasteride 5 milliGRAM(s) Oral daily  influenza   Vaccine 0.5 milliLiter(s) IntraMuscular once  levothyroxine 100 MICROGram(s) Oral daily  melatonin 3 milliGRAM(s) Oral at bedtime  ondansetron Injectable 4 milliGRAM(s) IV Push once  oxybutynin 10 milliGRAM(s) Oral daily  pantoprazole    Tablet 40 milliGRAM(s) Oral before breakfast  polyethylene glycol 3350 17 Gram(s) Oral at bedtime  sacubitril 49 mG/valsartan 51 mG 1 Tablet(s) Oral two times a day  senna 2 Tablet(s) Oral at bedtime  tamsulosin 0.4 milliGRAM(s) Oral at bedtime    MEDICATIONS  (PRN):  albuterol/ipratropium for Nebulization 3 milliLiter(s) Nebulizer every 6 hours PRN Shortness of Breath and/or Wheezing  bisacodyl Suppository 10 milliGRAM(s) Rectal once PRN Constipation  HYDROmorphone  Injectable 0.5 milliGRAM(s) IV Push every 3 hours PRN Severe Pain (7 - 10)  ketorolac   Injectable 30 milliGRAM(s) IV Push once PRN Severe Pain (7 - 10)  magnesium hydroxide Suspension 30 milliLiter(s) Oral daily PRN Constipation  oxyCODONE    IR 10 milliGRAM(s) Oral every 3 hours PRN Severe Pain (7 - 10)  oxyCODONE    IR 5 milliGRAM(s) Oral every 3 hours PRN Moderate Pain (4 - 6)    PE:  Alert and oriented X 4  Neck:  w/o JVD   Lungs:  CTA   Cor:  distant heart sounds S1S2  Abd:   obese, +BS-BM  voiding adequately   Ext:    Both legs locked in extension with Bilateral Ran braces.   First post op dressing still in place.  They are clean , dry and completely intact.    Patient is experiencing some itching probably from web roll.  Feet warm, no skin breakdown, + neurovascular intact   Sacrum evaluated by nursing no skin breakdown noted.                            11.7   6.85  )-----------( 173      ( 26 Mar 2021 05:30 )             35.5   03-26    143  |  109<H>  |  22  ----------------------------<  119<H>  3.9   |  27  |  1.06    Ca    8.6      26 Mar 2021 05:30    Thyroid Stimulating Hormone, Serum: 18.90 uIU/mL (03.24.21 @ 05:30)    Thyroperoxidase Antibody: 433.0 IU/mL (03.25.21 @ 07:30)    Free Thyroxine, Serum: 1.0 ng/dL (03.24.21 @ 05:30)

## 2021-03-28 LAB — SARS-COV-2 RNA SPEC QL NAA+PROBE: SIGNIFICANT CHANGE UP

## 2021-03-28 PROCEDURE — 99232 SBSQ HOSP IP/OBS MODERATE 35: CPT

## 2021-03-28 RX ORDER — SODIUM CHLORIDE 0.65 %
1 AEROSOL, SPRAY (ML) NASAL THREE TIMES A DAY
Refills: 0 | Status: DISCONTINUED | OUTPATIENT
Start: 2021-03-28 | End: 2021-04-02

## 2021-03-28 RX ADMIN — Medication 975 MILLIGRAM(S): at 22:10

## 2021-03-28 RX ADMIN — OXYCODONE HYDROCHLORIDE 10 MILLIGRAM(S): 5 TABLET ORAL at 21:10

## 2021-03-28 RX ADMIN — HYDROMORPHONE HYDROCHLORIDE 0.5 MILLIGRAM(S): 2 INJECTION INTRAMUSCULAR; INTRAVENOUS; SUBCUTANEOUS at 22:50

## 2021-03-28 RX ADMIN — PANTOPRAZOLE SODIUM 40 MILLIGRAM(S): 20 TABLET, DELAYED RELEASE ORAL at 05:15

## 2021-03-28 RX ADMIN — SACUBITRIL AND VALSARTAN 1 TABLET(S): 24; 26 TABLET, FILM COATED ORAL at 05:14

## 2021-03-28 RX ADMIN — HYDROMORPHONE HYDROCHLORIDE 0.5 MILLIGRAM(S): 2 INJECTION INTRAMUSCULAR; INTRAVENOUS; SUBCUTANEOUS at 22:33

## 2021-03-28 RX ADMIN — Medication 975 MILLIGRAM(S): at 05:15

## 2021-03-28 RX ADMIN — Medication 10 MILLIGRAM(S): at 12:11

## 2021-03-28 RX ADMIN — Medication 975 MILLIGRAM(S): at 14:25

## 2021-03-28 RX ADMIN — APIXABAN 5 MILLIGRAM(S): 2.5 TABLET, FILM COATED ORAL at 17:51

## 2021-03-28 RX ADMIN — TAMSULOSIN HYDROCHLORIDE 0.4 MILLIGRAM(S): 0.4 CAPSULE ORAL at 20:21

## 2021-03-28 RX ADMIN — APIXABAN 5 MILLIGRAM(S): 2.5 TABLET, FILM COATED ORAL at 05:15

## 2021-03-28 RX ADMIN — Medication 100 MICROGRAM(S): at 05:15

## 2021-03-28 RX ADMIN — FINASTERIDE 5 MILLIGRAM(S): 5 TABLET, FILM COATED ORAL at 12:14

## 2021-03-28 RX ADMIN — OXYCODONE HYDROCHLORIDE 10 MILLIGRAM(S): 5 TABLET ORAL at 22:10

## 2021-03-28 RX ADMIN — Medication 975 MILLIGRAM(S): at 05:45

## 2021-03-28 RX ADMIN — Medication 975 MILLIGRAM(S): at 14:45

## 2021-03-28 RX ADMIN — CARVEDILOL PHOSPHATE 25 MILLIGRAM(S): 80 CAPSULE, EXTENDED RELEASE ORAL at 17:51

## 2021-03-28 RX ADMIN — AMIODARONE HYDROCHLORIDE 200 MILLIGRAM(S): 400 TABLET ORAL at 05:15

## 2021-03-28 RX ADMIN — Medication 3 MILLIGRAM(S): at 21:10

## 2021-03-28 RX ADMIN — SACUBITRIL AND VALSARTAN 1 TABLET(S): 24; 26 TABLET, FILM COATED ORAL at 17:51

## 2021-03-28 RX ADMIN — Medication 975 MILLIGRAM(S): at 21:11

## 2021-03-28 RX ADMIN — CARVEDILOL PHOSPHATE 25 MILLIGRAM(S): 80 CAPSULE, EXTENDED RELEASE ORAL at 05:15

## 2021-03-28 RX ADMIN — SENNA PLUS 2 TABLET(S): 8.6 TABLET ORAL at 20:21

## 2021-03-28 RX ADMIN — ATORVASTATIN CALCIUM 40 MILLIGRAM(S): 80 TABLET, FILM COATED ORAL at 20:21

## 2021-03-28 NOTE — PROGRESS NOTE ADULT - SUBJECTIVE AND OBJECTIVE BOX
Patient is a 60y old  Male who presents with a chief complaint of bilateral quadriceps rupture (27 Mar 2021 19:38)      Patient seen and examined at bedside.  No overnight events  No complaints this morning    ALLERGIES:  No Known Allergies    MEDICATIONS  (STANDING):  acetaminophen   Tablet .. 975 milliGRAM(s) Oral every 8 hours  aMIOdarone    Tablet 200 milliGRAM(s) Oral daily  apixaban 5 milliGRAM(s) Oral two times a day  atorvastatin 40 milliGRAM(s) Oral at bedtime  carvedilol 25 milliGRAM(s) Oral two times a day  finasteride 5 milliGRAM(s) Oral daily  influenza   Vaccine 0.5 milliLiter(s) IntraMuscular once  levothyroxine 100 MICROGram(s) Oral daily  melatonin 3 milliGRAM(s) Oral at bedtime  ondansetron Injectable 4 milliGRAM(s) IV Push once  oxybutynin 10 milliGRAM(s) Oral daily  pantoprazole    Tablet 40 milliGRAM(s) Oral before breakfast  polyethylene glycol 3350 17 Gram(s) Oral at bedtime  sacubitril 49 mG/valsartan 51 mG 1 Tablet(s) Oral two times a day  senna 2 Tablet(s) Oral at bedtime  tamsulosin 0.4 milliGRAM(s) Oral at bedtime    MEDICATIONS  (PRN):  albuterol/ipratropium for Nebulization 3 milliLiter(s) Nebulizer every 6 hours PRN Shortness of Breath and/or Wheezing  HYDROmorphone  Injectable 0.5 milliGRAM(s) IV Push every 3 hours PRN Severe Pain (7 - 10)  magnesium hydroxide Suspension 30 milliLiter(s) Oral daily PRN Constipation  oxyCODONE    IR 10 milliGRAM(s) Oral every 3 hours PRN Severe Pain (7 - 10)  oxyCODONE    IR 5 milliGRAM(s) Oral every 3 hours PRN Moderate Pain (4 - 6)    Vital Signs Last 24 Hrs  T(F): 97.9 (28 Mar 2021 09:14), Max: 98.6 (28 Mar 2021 00:04)  HR: 104 (28 Mar 2021 09:14) (56 - 104)  BP: 153/88 (28 Mar 2021 09:14) (108/66 - 153/88)  RR: 18 (28 Mar 2021 09:14) (17 - 18)  SpO2: 98% (28 Mar 2021 09:14) (92% - 98%)  I&O's Summary    27 Mar 2021 07:01  -  28 Mar 2021 07:00  --------------------------------------------------------  IN: 200 mL / OUT: 700 mL / NET: -500 mL          PHYSICAL EXAM:  GENERAL: NAD  HENT:  Atraumatic, Normocephalic; No tonsillar erythema, exudates, or enlargement; Moist mucous membranes;   EYES: EOMI, PERRLA, conjunctiva and sclera clear, no lid-lag  NECK: Supple, No JVD, Normal thyroid  NERVOUS SYSTEM:  CN II - XII intact; Sensation intact; Motor Strength 5/5 B/L upper and lower extremities  CHEST/LUNG: Clear to percussion bilaterally; No rales, rhonchi, wheezing, or rubs; normal respiratory effort, no intercostal retractions  HEART: Regular rate and rhythm; No murmurs, rubs, or gallops  ABDOMEN: Soft, Nontender, Nondistended; Bowel sounds present; No HSM  MUSCULOSKELETAL/EXTREMITIES:  2+ Peripheral Pulses, No clubbing, cyanosis, or peripheral edema; No digital cyanosis  ROM (pain, crepitation or contracture)  SKIN: No rashes or lesions; normal texture and temperature  PSYCH: Appropriate affect, Alert & Oriented x 3    LABS:                        11.7   6.85  )-----------( 173      ( 26 Mar 2021 05:30 )             35.5       03-26    143  |  109  |  22  ----------------------------<  119  3.9   |  27  |  1.06    Ca    8.6      26 Mar 2021 05:30       eGFR if Non African American: 75 mL/min/1.73M2 (03-26-21 @ 05:30)  eGFR if African American: 87 mL/min/1.73M2 (03-26-21 @ 05:30)    Care Discussed with Consultants/Other Providers: Yes   Patient is a 60y old  Male who presents with a chief complaint of bilateral quadriceps rupture (27 Mar 2021 19:38)      Patient seen and examined at bedside.  No overnight events  No complaints this morning    ALLERGIES:  No Known Allergies    MEDICATIONS  (STANDING):  acetaminophen   Tablet .. 975 milliGRAM(s) Oral every 8 hours  aMIOdarone    Tablet 200 milliGRAM(s) Oral daily  apixaban 5 milliGRAM(s) Oral two times a day  atorvastatin 40 milliGRAM(s) Oral at bedtime  carvedilol 25 milliGRAM(s) Oral two times a day  finasteride 5 milliGRAM(s) Oral daily  influenza   Vaccine 0.5 milliLiter(s) IntraMuscular once  levothyroxine 100 MICROGram(s) Oral daily  melatonin 3 milliGRAM(s) Oral at bedtime  ondansetron Injectable 4 milliGRAM(s) IV Push once  oxybutynin 10 milliGRAM(s) Oral daily  pantoprazole    Tablet 40 milliGRAM(s) Oral before breakfast  polyethylene glycol 3350 17 Gram(s) Oral at bedtime  sacubitril 49 mG/valsartan 51 mG 1 Tablet(s) Oral two times a day  senna 2 Tablet(s) Oral at bedtime  tamsulosin 0.4 milliGRAM(s) Oral at bedtime    MEDICATIONS  (PRN):  albuterol/ipratropium for Nebulization 3 milliLiter(s) Nebulizer every 6 hours PRN Shortness of Breath and/or Wheezing  HYDROmorphone  Injectable 0.5 milliGRAM(s) IV Push every 3 hours PRN Severe Pain (7 - 10)  magnesium hydroxide Suspension 30 milliLiter(s) Oral daily PRN Constipation  oxyCODONE    IR 10 milliGRAM(s) Oral every 3 hours PRN Severe Pain (7 - 10)  oxyCODONE    IR 5 milliGRAM(s) Oral every 3 hours PRN Moderate Pain (4 - 6)    Vital Signs Last 24 Hrs  T(F): 97.9 (28 Mar 2021 09:14), Max: 98.6 (28 Mar 2021 00:04)  HR: 104 (28 Mar 2021 09:14) (56 - 104)  BP: 153/88 (28 Mar 2021 09:14) (108/66 - 153/88)  RR: 18 (28 Mar 2021 09:14) (17 - 18)  SpO2: 98% (28 Mar 2021 09:14) (92% - 98%)  I&O's Summary    27 Mar 2021 07:01  -  28 Mar 2021 07:00  --------------------------------------------------------  IN: 200 mL / OUT: 700 mL / NET: -500 mL          PHYSICAL EXAM:  GENERAL: NAD  HENT:  Atraumatic, Normocephalic; No tonsillar erythema, exudates, or enlargement; Moist mucous membranes;   EYES: EOMI, PERRLA, conjunctiva and sclera clear, no lid-lag  NECK: Supple, No JVD, Normal thyroid  CHEST/LUNG: Clear to percussion bilaterally; No rales, rhonchi, wheezing, or rubs; normal respiratory effort, no intercostal retractions  HEART: Regular rate and rhythm; No murmurs, rubs, or gallops  ABDOMEN: Soft, Nontender, Obese abdomen; Bowel sounds present; No HSM  SKIN: No rashes or lesions; normal texture and temperature  PSYCH: Appropriate affect, Alert & Oriented x 3    LABS:                        11.7   6.85  )-----------( 173      ( 26 Mar 2021 05:30 )             35.5       03-26    143  |  109  |  22  ----------------------------<  119  3.9   |  27  |  1.06    Ca    8.6      26 Mar 2021 05:30       eGFR if Non African American: 75 mL/min/1.73M2 (03-26-21 @ 05:30)  eGFR if African American: 87 mL/min/1.73M2 (03-26-21 @ 05:30)    Care Discussed with Consultants/Other Providers: Yes

## 2021-03-28 NOTE — PROGRESS NOTE ADULT - SUBJECTIVE AND OBJECTIVE BOX
Patient is a 60y old  Male who presents with a chief complaint of bilateral quadriceps rupture (28 Mar 2021 09:19)  pt POD # 6 s/p b/l quadriceps tendon repair  no events noted over night  pt feels well, denies complaints    Patient seen and examined at bedside    ALLERGIES:  No Known Allergies    MEDICATIONS  (STANDING):  acetaminophen   Tablet .. 975 milliGRAM(s) Oral every 8 hours  aMIOdarone    Tablet 200 milliGRAM(s) Oral daily  apixaban 5 milliGRAM(s) Oral two times a day  atorvastatin 40 milliGRAM(s) Oral at bedtime  carvedilol 25 milliGRAM(s) Oral two times a day  finasteride 5 milliGRAM(s) Oral daily  influenza   Vaccine 0.5 milliLiter(s) IntraMuscular once  levothyroxine 100 MICROGram(s) Oral daily  melatonin 3 milliGRAM(s) Oral at bedtime  ondansetron Injectable 4 milliGRAM(s) IV Push once  oxybutynin 10 milliGRAM(s) Oral daily  pantoprazole    Tablet 40 milliGRAM(s) Oral before breakfast  polyethylene glycol 3350 17 Gram(s) Oral at bedtime  sacubitril 49 mG/valsartan 51 mG 1 Tablet(s) Oral two times a day  senna 2 Tablet(s) Oral at bedtime  tamsulosin 0.4 milliGRAM(s) Oral at bedtime    MEDICATIONS  (PRN):  albuterol/ipratropium for Nebulization 3 milliLiter(s) Nebulizer every 6 hours PRN Shortness of Breath and/or Wheezing  HYDROmorphone  Injectable 0.5 milliGRAM(s) IV Push every 3 hours PRN Severe Pain (7 - 10)  magnesium hydroxide Suspension 30 milliLiter(s) Oral daily PRN Constipation  oxyCODONE    IR 10 milliGRAM(s) Oral every 3 hours PRN Severe Pain (7 - 10)  oxyCODONE    IR 5 milliGRAM(s) Oral every 3 hours PRN Moderate Pain (4 - 6)    Vital Signs Last 24 Hrs  T(F): 97.9 (28 Mar 2021 09:14), Max: 98.6 (28 Mar 2021 00:04)  HR: 104 (28 Mar 2021 09:14) (56 - 104)  BP: 153/88 (28 Mar 2021 09:14) (108/66 - 153/88)  RR: 18 (28 Mar 2021 09:14) (17 - 18)  SpO2: 98% (28 Mar 2021 09:14) (93% - 98%)    I&O's Summary    27 Mar 2021 07:01  -  28 Mar 2021 07:00  --------------------------------------------------------  IN: 200 mL / OUT: 700 mL / NET: -500 mL    PHYSICAL EXAM:  General: NAD, A/O x 3  ENT: MMM  Neck: Supple, No JVD  Abdomen: Soft, Nontender, Nondistended  Extremities: b/l Ran braces in place, locked in 0 degree flexion, thighs wnl, not swollen, sensation intact, ACE bandages in place b/l, c/d/i, skin intact, warm and dry, EHL/FHL 5/5, DP 2+ b/l    LABS:                        11.7   6.85  )-----------( 173      ( 26 Mar 2021 05:30 )             35.5     03-26    143  |  109  |  22  ----------------------------<  119  3.9   |  27  |  1.06    Ca    8.6      26 Mar 2021 05:30      eGFR if Non African American: 75 mL/min/1.73M2 (03-26-21 @ 05:30)  eGFR if African American: 87 mL/min/1.73M2 (03-26-21 @ 05:30)

## 2021-03-28 NOTE — PROGRESS NOTE ADULT - ASSESSMENT
60 year old morbidly obese male with  HTN, Afib on Eliquis, Cardiomyopathy (latest EF 53% - improved from 25%), s/p AICD, ERICK (cannot tolerate CPAP-noncompliant), HLD, BPH, arthritis, s/p fall 2/4/2021, discharged to rehab. After 2 weeks of rehab without improvement in gait, further work up was performed and he was found to have bilateral quadriceps tendon rupture. S/p repair on 3/22 with Orthopaedist Dr. Caro.    # acute hypoxic respiratory failure likely due to atelectasis/obesity  # ERICK w/ overnight desaturations  - suspect atelectasis. Able to wean O2 from 6L -->2 L just with incentive spirometry. Continue IS.   - CXR without acute abnormalities.  - Pt denies smoking hx.   - BNP mild elevation / ULN. He is euvolemic on exam.   - patient needs outpatient sleep study and he has no home CPAP machine - reportedly didn't tolerate study.   - duonebs PRN    # Diastolic heart failure s/p AICD (Heart failure preserved ejection fraction improved) - chronic  # A fib - chronic  # HTN  - appears euvolemic  - cont amiodarone, eliquis, entresto  - Cards also on consult. Recommend continued outpatient Cardiology f/u on discharge.     # BPH  - cont tamsulosin and finasteride    # B/l Quadriceps tendon rupture s/p repair 3/22  - Ortho: PT/OT in bed for now; Absolutely no leg flexion (6 weeks total); WBAT bilateral when patient becomes able to be OOB   - DVT ppx - on eliquis  - PT eval ordered - TITUS  - bowel regimen - recommend more aggressive in setting of constipation  - pain control  - no recent quinolone use.    # Biochemical hypothyroidism  - TSH elevated to 18.90, Total T3 low at 72, Free thyroxine LLN at 1.0.   - Repeat TSH in  4-6 weeks (4/21)  - Endocrinology, Dr. Lau was consulted, On levothyroxine 100 mcg a day.  - f/u thyroid peroxidase antibodies  - As per cardio, titrate up on synthroid slowly since significant heart disease    # Morbid obesity  - nutrition consult placed    Dispo: awaiting auth

## 2021-03-28 NOTE — PROGRESS NOTE ADULT - ASSESSMENT
Patient is a 60y old  Male who presents with a chief complaint of bilateral quadriceps rupture (28 Mar 2021 09:19)  pt POD # 6 s/p b/l quadriceps tendon repair  no events noted over night  pt feels well, denies complaints    pt doing well from orthopaedic standpoint    pt able to bear weight on b/l LE BUT ABSOLUTELY CANNOT FLEX, this is difficult for pt due to his body habitus, although he is able to move a little bit when in bed                  plan  Will redress surgical incisions prior to discharge to [facility]   Eliquis for DVT prophylaxis and A Fib / stents  Continue bed PT until patient cleared by Dr Caro   Laxatives for constipation As per medicine  [facility] - awaiting auth

## 2021-03-29 ENCOUNTER — TRANSCRIPTION ENCOUNTER (OUTPATIENT)
Age: 61
End: 2021-03-29

## 2021-03-29 PROCEDURE — 99232 SBSQ HOSP IP/OBS MODERATE 35: CPT

## 2021-03-29 RX ORDER — ACETAMINOPHEN 500 MG
3 TABLET ORAL
Qty: 0 | Refills: 0 | DISCHARGE
Start: 2021-03-29

## 2021-03-29 RX ORDER — POLYETHYLENE GLYCOL 3350 17 G/17G
17 POWDER, FOR SOLUTION ORAL
Qty: 0 | Refills: 0 | DISCHARGE
Start: 2021-03-29

## 2021-03-29 RX ORDER — SENNA PLUS 8.6 MG/1
2 TABLET ORAL
Qty: 0 | Refills: 0 | DISCHARGE
Start: 2021-03-29

## 2021-03-29 RX ORDER — PANTOPRAZOLE SODIUM 20 MG/1
1 TABLET, DELAYED RELEASE ORAL
Qty: 0 | Refills: 0 | DISCHARGE
Start: 2021-03-29

## 2021-03-29 RX ORDER — LEVOTHYROXINE SODIUM 125 MCG
1 TABLET ORAL
Qty: 0 | Refills: 0 | DISCHARGE
Start: 2021-03-29

## 2021-03-29 RX ORDER — IPRATROPIUM/ALBUTEROL SULFATE 18-103MCG
3 AEROSOL WITH ADAPTER (GRAM) INHALATION
Qty: 0 | Refills: 0 | DISCHARGE
Start: 2021-03-29

## 2021-03-29 RX ADMIN — FINASTERIDE 5 MILLIGRAM(S): 5 TABLET, FILM COATED ORAL at 11:27

## 2021-03-29 RX ADMIN — CARVEDILOL PHOSPHATE 25 MILLIGRAM(S): 80 CAPSULE, EXTENDED RELEASE ORAL at 05:56

## 2021-03-29 RX ADMIN — HYDROMORPHONE HYDROCHLORIDE 0.5 MILLIGRAM(S): 2 INJECTION INTRAMUSCULAR; INTRAVENOUS; SUBCUTANEOUS at 23:09

## 2021-03-29 RX ADMIN — SACUBITRIL AND VALSARTAN 1 TABLET(S): 24; 26 TABLET, FILM COATED ORAL at 05:56

## 2021-03-29 RX ADMIN — Medication 10 MILLIGRAM(S): at 11:27

## 2021-03-29 RX ADMIN — Medication 975 MILLIGRAM(S): at 20:15

## 2021-03-29 RX ADMIN — PANTOPRAZOLE SODIUM 40 MILLIGRAM(S): 20 TABLET, DELAYED RELEASE ORAL at 05:56

## 2021-03-29 RX ADMIN — Medication 975 MILLIGRAM(S): at 13:10

## 2021-03-29 RX ADMIN — APIXABAN 5 MILLIGRAM(S): 2.5 TABLET, FILM COATED ORAL at 05:56

## 2021-03-29 RX ADMIN — SACUBITRIL AND VALSARTAN 1 TABLET(S): 24; 26 TABLET, FILM COATED ORAL at 17:22

## 2021-03-29 RX ADMIN — TAMSULOSIN HYDROCHLORIDE 0.4 MILLIGRAM(S): 0.4 CAPSULE ORAL at 20:12

## 2021-03-29 RX ADMIN — OXYCODONE HYDROCHLORIDE 10 MILLIGRAM(S): 5 TABLET ORAL at 21:17

## 2021-03-29 RX ADMIN — AMIODARONE HYDROCHLORIDE 200 MILLIGRAM(S): 400 TABLET ORAL at 05:56

## 2021-03-29 RX ADMIN — ATORVASTATIN CALCIUM 40 MILLIGRAM(S): 80 TABLET, FILM COATED ORAL at 20:12

## 2021-03-29 RX ADMIN — Medication 975 MILLIGRAM(S): at 14:04

## 2021-03-29 RX ADMIN — Medication 3 MILLIGRAM(S): at 21:23

## 2021-03-29 RX ADMIN — Medication 100 MICROGRAM(S): at 05:56

## 2021-03-29 RX ADMIN — HYDROMORPHONE HYDROCHLORIDE 0.5 MILLIGRAM(S): 2 INJECTION INTRAMUSCULAR; INTRAVENOUS; SUBCUTANEOUS at 23:24

## 2021-03-29 RX ADMIN — Medication 975 MILLIGRAM(S): at 20:11

## 2021-03-29 RX ADMIN — OXYCODONE HYDROCHLORIDE 10 MILLIGRAM(S): 5 TABLET ORAL at 22:17

## 2021-03-29 RX ADMIN — Medication 975 MILLIGRAM(S): at 05:56

## 2021-03-29 RX ADMIN — CARVEDILOL PHOSPHATE 25 MILLIGRAM(S): 80 CAPSULE, EXTENDED RELEASE ORAL at 17:22

## 2021-03-29 RX ADMIN — APIXABAN 5 MILLIGRAM(S): 2.5 TABLET, FILM COATED ORAL at 17:22

## 2021-03-29 NOTE — DISCHARGE NOTE PROVIDER - NSDCFUADDINST_GEN_ALL_CORE_FT
Patient is WBAT bilateral legs but unable to stand 2/2 to his size.   Patient is healing and feeling stronger so PT would be able to again attempt   getting out of bed.     Patient may not flex knees at all.   Dr Caro to advance any change.     May shower but may not remove rosa braces . Must keep legs in extension. Patient is WBAT bilateral legs but unable to stand 2/2 to his size.   Patient is healing and feeling stronger so PT would be able to again attempt   getting out of bed.     Patient may not flex knees at all.   Dr Caro to advance any change.     May shower but may not remove rosa braces . Must keep legs in extension.    Patient started on Synthroid for possible Hashimotos Thyroiditis. Repeat TSH should be done in 4 - 6 weeks (After 4/21 would be 4 weeks) in order for dose to be adjusted as necessary.     Continue incentive spirometry as needed for atelectasis

## 2021-03-29 NOTE — DISCHARGE NOTE PROVIDER - CARE PROVIDERS DIRECT ADDRESSES
[email or web address]

## 2021-03-29 NOTE — PROGRESS NOTE ADULT - SUBJECTIVE AND OBJECTIVE BOX
Patient is a 60y old  Male who presents with a chief complaint of bilateral quadriceps rupture (29 Mar 2021 13:10)  pt POD # 7 s/p b/l quadriceps tendon repair  no events noted over night  pt feels well, pain well controlled    Patient seen and examined at bedside    ALLERGIES:  No Known Allergies    MEDICATIONS  (STANDING):  acetaminophen   Tablet .. 975 milliGRAM(s) Oral every 8 hours  aMIOdarone    Tablet 200 milliGRAM(s) Oral daily  apixaban 5 milliGRAM(s) Oral two times a day  atorvastatin 40 milliGRAM(s) Oral at bedtime  carvedilol 25 milliGRAM(s) Oral two times a day  finasteride 5 milliGRAM(s) Oral daily  influenza   Vaccine 0.5 milliLiter(s) IntraMuscular once  levothyroxine 100 MICROGram(s) Oral daily  melatonin 3 milliGRAM(s) Oral at bedtime  ondansetron Injectable 4 milliGRAM(s) IV Push once  oxybutynin 10 milliGRAM(s) Oral daily  pantoprazole    Tablet 40 milliGRAM(s) Oral before breakfast  polyethylene glycol 3350 17 Gram(s) Oral at bedtime  sacubitril 49 mG/valsartan 51 mG 1 Tablet(s) Oral two times a day  senna 2 Tablet(s) Oral at bedtime  tamsulosin 0.4 milliGRAM(s) Oral at bedtime    MEDICATIONS  (PRN):  albuterol/ipratropium for Nebulization 3 milliLiter(s) Nebulizer every 6 hours PRN Shortness of Breath and/or Wheezing  HYDROmorphone  Injectable 0.5 milliGRAM(s) IV Push every 3 hours PRN Severe Pain (7 - 10)  magnesium hydroxide Suspension 30 milliLiter(s) Oral daily PRN Constipation  oxyCODONE    IR 10 milliGRAM(s) Oral every 3 hours PRN Severe Pain (7 - 10)  oxyCODONE    IR 5 milliGRAM(s) Oral every 3 hours PRN Moderate Pain (4 - 6)  sodium chloride 0.65% Nasal 1 Spray(s) Both Nostrils three times a day PRN Nasal Congestion    Vital Signs Last 24 Hrs  T(F): 97.7 (29 Mar 2021 05:03), Max: 98.3 (28 Mar 2021 16:34)  HR: 58 (29 Mar 2021 05:03) (58 - 71)  BP: 166/75 (29 Mar 2021 05:03) (127/66 - 166/75)  RR: 18 (29 Mar 2021 05:03) (16 - 18)  SpO2: 94% (29 Mar 2021 05:03) (93% - 96%)    I&O's Summary    28 Mar 2021 07:01  -  29 Mar 2021 07:00  --------------------------------------------------------  IN: 680 mL / OUT: 700 mL / NET: -20 mL    PHYSICAL EXAM:  General: NAD, A/O x 3  ENT: MMM  Neck: Supple, No JVD  Abdomen: Soft, Nontender, Nondistended   : + primafit in place  Extremities: b/l LE locked in extension with Ran braces, Bledsoes along with ACE bandages were removed, incisions c/d/i, steri strips in place, skin intact, new ACE bandages placed, Ran braces were placed back into position, skin is warm and dry, EHL/FHL 5/5, sensation intact

## 2021-03-29 NOTE — DISCHARGE NOTE PROVIDER - NSDCCPCAREPLAN_GEN_ALL_CORE_FT
PRINCIPAL DISCHARGE DIAGNOSIS  Diagnosis: Quadriceps tendon rupture  Assessment and Plan of Treatment: bilateral quadriceps rupture for ORIF bilateral quadriceps tendon repair

## 2021-03-29 NOTE — PROGRESS NOTE ADULT - SUBJECTIVE AND OBJECTIVE BOX
Patient is a 60y old  Male who presents with a chief complaint of bilateral quadriceps rupture (28 Mar 2021 10:15)      Patient seen and examined at bedside.  No overnight events  No complaints this morning  Awaiting Auth for [facility]     ALLERGIES:  No Known Allergies    MEDICATIONS  (STANDING):  acetaminophen   Tablet .. 975 milliGRAM(s) Oral every 8 hours  aMIOdarone    Tablet 200 milliGRAM(s) Oral daily  apixaban 5 milliGRAM(s) Oral two times a day  atorvastatin 40 milliGRAM(s) Oral at bedtime  carvedilol 25 milliGRAM(s) Oral two times a day  finasteride 5 milliGRAM(s) Oral daily  influenza   Vaccine 0.5 milliLiter(s) IntraMuscular once  levothyroxine 100 MICROGram(s) Oral daily  melatonin 3 milliGRAM(s) Oral at bedtime  ondansetron Injectable 4 milliGRAM(s) IV Push once  oxybutynin 10 milliGRAM(s) Oral daily  pantoprazole    Tablet 40 milliGRAM(s) Oral before breakfast  polyethylene glycol 3350 17 Gram(s) Oral at bedtime  sacubitril 49 mG/valsartan 51 mG 1 Tablet(s) Oral two times a day  senna 2 Tablet(s) Oral at bedtime  tamsulosin 0.4 milliGRAM(s) Oral at bedtime    MEDICATIONS  (PRN):  albuterol/ipratropium for Nebulization 3 milliLiter(s) Nebulizer every 6 hours PRN Shortness of Breath and/or Wheezing  HYDROmorphone  Injectable 0.5 milliGRAM(s) IV Push every 3 hours PRN Severe Pain (7 - 10)  magnesium hydroxide Suspension 30 milliLiter(s) Oral daily PRN Constipation  oxyCODONE    IR 10 milliGRAM(s) Oral every 3 hours PRN Severe Pain (7 - 10)  oxyCODONE    IR 5 milliGRAM(s) Oral every 3 hours PRN Moderate Pain (4 - 6)  sodium chloride 0.65% Nasal 1 Spray(s) Both Nostrils three times a day PRN Nasal Congestion    Vital Signs Last 24 Hrs  T(F): 97.7 (29 Mar 2021 05:03), Max: 98.3 (28 Mar 2021 16:34)  HR: 58 (29 Mar 2021 05:03) (58 - 71)  BP: 166/75 (29 Mar 2021 05:03) (127/66 - 166/75)  RR: 18 (29 Mar 2021 05:03) (16 - 18)  SpO2: 94% (29 Mar 2021 05:03) (93% - 96%)  I&O's Summary    28 Mar 2021 07:01  -  29 Mar 2021 07:00  --------------------------------------------------------  IN: 680 mL / OUT: 700 mL / NET: -20 mL        PHYSICAL EXAM:  GENERAL: NAD, morbidly obese  HENT:  Atraumatic, Normocephalic; No tonsillar erythema, exudates, or enlargement; Moist mucous membranes;   EYES: EOMI, PERRLA, conjunctiva and sclera clear, no lid-lag  NECK: Supple, No JVD, Normal thyroid  CHEST/LUNG: Clear to percussion bilaterally; No rales, rhonchi, wheezing, or rubs; normal respiratory effort, no intercostal retractions  HEART: Regular rate and rhythm; No murmurs, rubs, or gallops  ABDOMEN: Soft, Nontender, Nondistended; Bowel sounds present; No HSM  MUSCULOSKELETAL/EXTREMITIES:  2+ Peripheral Pulses, No clubbing, cyanosis, or peripheral edema; No digital cyanosis  SKIN: No rashes or lesions; normal texture and temperature  PSYCH: Appropriate affect, Alert & Oriented x 3    LABS:      Care Discussed with Consultants/Other Providers: Yes

## 2021-03-29 NOTE — DISCHARGE NOTE PROVIDER - CARE PROVIDER_API CALL
Ej Caro)  Orthopaedic Sports Medicine; Orthopaedic Surgery  [address] [address]  Phone: [phone]  Fax: [phone]  Follow Up Time: 2 weeks

## 2021-03-29 NOTE — PROGRESS NOTE ADULT - ASSESSMENT
60 year old morbidly obese male with  HTN, Afib on Eliquis, Cardiomyopathy (latest EF 53% - improved from 25%), s/p AICD, ERICK (cannot tolerate CPAP-noncompliant), HLD, BPH, arthritis, s/p fall 2/4/2021, discharged to rehab. After 2 weeks of rehab without improvement in gait, further work up was performed and he was found to have bilateral quadriceps tendon rupture. S/p repair on 3/22 with Orthopaedist Dr. Caro.    # acute hypoxic respiratory failure likely due to atelectasis/obesity  # ERICK w/ overnight desaturations  - suspect atelectasis. Able to wean O2 from 6L -->2 L just with incentive spirometry. Continue IS.   - CXR without acute abnormalities.  - Pt denies smoking hx.   - BNP mild elevation / ULN. He is euvolemic on exam.   - patient needs outpatient sleep study and he has no home CPAP machine - reportedly didn't tolerate study.   - duonebs PRN  - patient is medically stable for discharge    # Diastolic heart failure s/p AICD (Heart failure preserved ejection fraction improved) - chronic  # A fib - chronic  # HTN  - appears euvolemic  - cont amiodarone, eliquis, entresto  - Cards also on consult. Recommend continued outpatient Cardiology f/u on discharge.     # BPH  - cont tamsulosin and finasteride    # B/l Quadriceps tendon rupture s/p repair 3/22  - Ortho: PT/OT in bed for now; Absolutely no leg flexion (6 weeks total); WBAT bilateral when patient becomes able to be OOB   - DVT ppx - on eliquis  - PT eval ordered - TITUS  - bowel regimen - recommend more aggressive in setting of constipation  - pain control  - no recent quinolone use.    # Biochemical hypothyroidism  - TSH elevated to 18.90, Total T3 low at 72, Free thyroxine LLN at 1.0.   - Repeat TSH in  4-6 weeks (4/21)  - Endocrinology, Dr. Lau was consulted, On levothyroxine 100 mcg a day.  - f/u thyroid peroxidase antibodies  - As per cardio, titrate up on synthroid slowly since significant heart disease    # Morbid obesity  - nutrition consult placed    Dispo: awaiting auth

## 2021-03-29 NOTE — DISCHARGE NOTE PROVIDER - NSDCMRMEDTOKEN_GEN_ALL_CORE_FT
acetaminophen 325 mg oral tablet: 3 tab(s) orally every 8 hours  amiodarone 200 mg oral tablet: 1 tab(s) orally once a day  atorvastatin 40 mg oral tablet: 1 tab(s) orally once a day  carvedilol 25 mg oral tablet: 1 tab(s) orally 2 times a day  Eliquis 5 mg oral tablet: 1 tab(s) orally 2 times a day  Entresto 49 mg-51 mg oral tablet: 1 tab(s) orally 2 times a day  finasteride 5 mg oral tablet: 1 tab(s) orally once a day  Flomax 0.4 mg oral capsule: 1 cap(s) orally once a day  ipratropium-albuterol 0.5 mg-2.5 mg/3 mLinhalation solution: 3 milliliter(s) inhaled every 6 hours, As needed, Shortness of Breath and/or Wheezing  levothyroxine 100 mcg (0.1 mg) oral tablet: 1 tab(s) orally once a day  Melatonin 3 mg oral tablet: 1 tab(s) orally once a day (at bedtime)  oxybutynin 10 mg/24 hr oral tablet, extended release: 1 tab(s) orally once a day  pantoprazole 40 mg oral delayed release tablet: 1 tab(s) orally once a day (before a meal)  polyethylene glycol 3350 oral powder for reconstitution: 17 gram(s) orally once a day (at bedtime)  senna oral tablet: 2 tab(s) orally once a day (at bedtime)  traMADol 50 mg oral tablet: orally every 6 hours, As Needed   acetaminophen 325 mg oral tablet: 3 tab(s) orally every 8 hours  amiodarone 200 mg oral tablet: 1 tab(s) orally once a day  Aspirin Enteric Coated 81 mg oral delayed release tablet: 1 tab(s) orally once a day  atorvastatin 40 mg oral tablet: 1 tab(s) orally once a day  carvedilol 25 mg oral tablet: 1 tab(s) orally 2 times a day  Eliquis 5 mg oral tablet: 1 tab(s) orally 2 times a day  Entresto 49 mg-51 mg oral tablet: 1 tab(s) orally 2 times a day  finasteride 5 mg oral tablet: 1 tab(s) orally once a day  Flomax 0.4 mg oral capsule: 1 cap(s) orally once a day  ipratropium-albuterol 0.5 mg-2.5 mg/3 mLinhalation solution: 3 milliliter(s) inhaled every 6 hours, As needed, Shortness of Breath and/or Wheezing  levothyroxine 100 mcg (0.1 mg) oral tablet: 1 tab(s) orally once a day  magnesium hydroxide 8% oral suspension: 30 milliliter(s) orally once a day, As needed, Constipation  Melatonin 3 mg oral tablet: 1 tab(s) orally once a day (at bedtime)  oxybutynin 10 mg/24 hr oral tablet, extended release: 1 tab(s) orally once a day  oxyCODONE 10 mg oral tablet: 1 tab(s) orally every 6 hours, As Needed - (Pain 7- 10)  oxyCODONE 5 mg oral tablet: 1 tab(s) orally every 3 hours, As needed, Moderate Pain (4 - 6)  pantoprazole 40 mg oral delayed release tablet: 1 tab(s) orally once a day (before a meal)  polyethylene glycol 3350 oral powder for reconstitution: 17 gram(s) orally once a day (at bedtime)  senna oral tablet: 2 tab(s) orally once a day (at bedtime)  senna oral tablet: 2 tab(s) orally once a day (at bedtime)  sodium chloride 0.65% nasal spray:  nasal

## 2021-03-29 NOTE — DISCHARGE NOTE PROVIDER - NSDCCPTREATMENT_GEN_ALL_CORE_FT
PRINCIPAL PROCEDURE  Procedure: Repair of rupture of patellar or quadriceps tendon  Findings and Treatment: Bilateral tendon repair

## 2021-03-29 NOTE — DISCHARGE NOTE PROVIDER - NSDCFUADDAPPT_GEN_ALL_CORE_FT
call Dr Caro s  office for appt. in two weeks  call Dr Caro s  office for appt. in two weeks     Make appointment with his cardiologist 1 - 2 weeks after discharge.

## 2021-03-29 NOTE — DISCHARGE NOTE PROVIDER - HOSPITAL COURSE
HPI:  Patient gives hx of falling down the stairs on Feb 4.   He was taken  to [facility] for evaluation ,    He was sent home but patient unable to    ambulate .   He was admitted to a rehab facility for therapy.   The patient was not improving with therapy and was sent for orthopedic evaluation.  He underwent testing and was found to have bilateral quadriceps rupture.   Patient scheduled for bilateral repair this afternoon and will then return   to rehab center. (22 Mar 2021 14:03)    The patient underwent the scheduled procedure.   He is now orthopedically stable.   Patient has hx of CAD/ w stents , AICD, a-fib, hypothyroid , bph, morbid obesity.  The patient is medically stable and we are awaiting authorization to return patient to [facility].   The patient is WBAT with right and left leg.   He has only had PT /OT in bed 2/2 inability to get out of bed due to obesity . The patient has been given post op instructions and he   will follow up with Dr Caro in 2 weeks.   HPI:  Patient gives hx of falling down the stairs on Feb 4.   He was taken  to [facility] for evaluation ,    He was sent home but patient unable to    ambulate .   He was admitted to a rehab facility for therapy.   The patient was not improving with therapy and was sent for orthopedic evaluation.  He underwent testing and was found to have bilateral quadriceps rupture.   Patient scheduled for bilateral repair this afternoon and will then return   to rehab center. (22 Mar 2021 14:03).    Hospital course:  60 year old morbidly obese male with  HTN, Afib on Eliquis, Cardiomyopathy (latest EF 53% - improved from 25%), CAD w/ stents, s/p AICD, ERICK (did not tolerate sleep study), HLD, BPH, arthritis, s/p fall 2/4/2021, discharged to rehab. After 2 weeks of rehab without improvement in gait, further work up was performed and he was found to have bilateral quadriceps tendon rupture. S/p repair on 3/22 with Orthopaedist Dr. Caro.    Pt is to wear knee immobilizers at all times. He has only had PT /OT in bed 2/2 inability to get out of bed due to obesity. Ortho Would like to see him WBAT bilaterally but his size continues to be an issue, and they do not want re-injury  The patient has been given post op instructions and he will follow up with Dr Caro in 2 weeks.    He was incidentally found to have hypothyroidism while admitted, started on synthroid.    Discharging Provider: Sandhya Vergara MD  Contact Info: [phone]- Please call with any questions or concerns.    Signout given to  SNF Provider: Dr. Maldonado.

## 2021-03-29 NOTE — PROGRESS NOTE ADULT - ASSESSMENT
Patient is a 60y old  Male who presents with a chief complaint of bilateral quadriceps rupture (29 Mar 2021 13:10)  pt POD # 7 s/p b/l quadriceps tendon repair  no events noted over night  pt feels well, pain well controlled      pt doing well from orthopaedic standpoint    plan  - DVT prophylaxis  - decubitus prophylaxis  - PT in bed  - ABSOLUTELY NO B/L LE FLEXION FOR 6 WEEKS  - awaiting TITUS placement

## 2021-03-30 PROCEDURE — 99232 SBSQ HOSP IP/OBS MODERATE 35: CPT

## 2021-03-30 RX ORDER — OXYCODONE HYDROCHLORIDE 5 MG/1
10 TABLET ORAL
Refills: 0 | Status: DISCONTINUED | OUTPATIENT
Start: 2021-03-30 | End: 2021-04-02

## 2021-03-30 RX ORDER — OXYCODONE HYDROCHLORIDE 5 MG/1
5 TABLET ORAL
Refills: 0 | Status: DISCONTINUED | OUTPATIENT
Start: 2021-03-30 | End: 2021-04-02

## 2021-03-30 RX ADMIN — FINASTERIDE 5 MILLIGRAM(S): 5 TABLET, FILM COATED ORAL at 13:41

## 2021-03-30 RX ADMIN — AMIODARONE HYDROCHLORIDE 200 MILLIGRAM(S): 400 TABLET ORAL at 06:30

## 2021-03-30 RX ADMIN — ATORVASTATIN CALCIUM 40 MILLIGRAM(S): 80 TABLET, FILM COATED ORAL at 21:17

## 2021-03-30 RX ADMIN — Medication 975 MILLIGRAM(S): at 21:14

## 2021-03-30 RX ADMIN — CARVEDILOL PHOSPHATE 25 MILLIGRAM(S): 80 CAPSULE, EXTENDED RELEASE ORAL at 06:30

## 2021-03-30 RX ADMIN — Medication 975 MILLIGRAM(S): at 06:30

## 2021-03-30 RX ADMIN — Medication 975 MILLIGRAM(S): at 13:41

## 2021-03-30 RX ADMIN — Medication 975 MILLIGRAM(S): at 07:56

## 2021-03-30 RX ADMIN — PANTOPRAZOLE SODIUM 40 MILLIGRAM(S): 20 TABLET, DELAYED RELEASE ORAL at 06:30

## 2021-03-30 RX ADMIN — Medication 975 MILLIGRAM(S): at 21:44

## 2021-03-30 RX ADMIN — CARVEDILOL PHOSPHATE 25 MILLIGRAM(S): 80 CAPSULE, EXTENDED RELEASE ORAL at 21:17

## 2021-03-30 RX ADMIN — Medication 975 MILLIGRAM(S): at 13:51

## 2021-03-30 RX ADMIN — Medication 10 MILLIGRAM(S): at 13:41

## 2021-03-30 RX ADMIN — Medication 3 MILLIGRAM(S): at 22:39

## 2021-03-30 RX ADMIN — SACUBITRIL AND VALSARTAN 1 TABLET(S): 24; 26 TABLET, FILM COATED ORAL at 06:30

## 2021-03-30 RX ADMIN — OXYCODONE HYDROCHLORIDE 10 MILLIGRAM(S): 5 TABLET ORAL at 23:10

## 2021-03-30 RX ADMIN — APIXABAN 5 MILLIGRAM(S): 2.5 TABLET, FILM COATED ORAL at 06:30

## 2021-03-30 RX ADMIN — APIXABAN 5 MILLIGRAM(S): 2.5 TABLET, FILM COATED ORAL at 18:41

## 2021-03-30 RX ADMIN — Medication 100 MICROGRAM(S): at 06:30

## 2021-03-30 RX ADMIN — TAMSULOSIN HYDROCHLORIDE 0.4 MILLIGRAM(S): 0.4 CAPSULE ORAL at 21:17

## 2021-03-30 RX ADMIN — OXYCODONE HYDROCHLORIDE 10 MILLIGRAM(S): 5 TABLET ORAL at 22:40

## 2021-03-30 NOTE — PROGRESS NOTE ADULT - SUBJECTIVE AND OBJECTIVE BOX
Patient is a 60y old  Male who presents with a chief complaint of bilateral quadriceps rupture (30 Mar 2021 08:51)  Pt POD # 8 s/p b/l quadriceps tendon repair  no events noted over night, pt doing well  pain well managed    Patient seen and examined at bedside    ALLERGIES:  No Known Allergies    MEDICATIONS  (STANDING):  acetaminophen   Tablet .. 975 milliGRAM(s) Oral every 8 hours  aMIOdarone    Tablet 200 milliGRAM(s) Oral daily  apixaban 5 milliGRAM(s) Oral two times a day  atorvastatin 40 milliGRAM(s) Oral at bedtime  carvedilol 25 milliGRAM(s) Oral two times a day  finasteride 5 milliGRAM(s) Oral daily  influenza   Vaccine 0.5 milliLiter(s) IntraMuscular once  levothyroxine 100 MICROGram(s) Oral daily  melatonin 3 milliGRAM(s) Oral at bedtime  ondansetron Injectable 4 milliGRAM(s) IV Push once  oxybutynin 10 milliGRAM(s) Oral daily  pantoprazole    Tablet 40 milliGRAM(s) Oral before breakfast  polyethylene glycol 3350 17 Gram(s) Oral at bedtime  sacubitril 49 mG/valsartan 51 mG 1 Tablet(s) Oral two times a day  senna 2 Tablet(s) Oral at bedtime  tamsulosin 0.4 milliGRAM(s) Oral at bedtime    MEDICATIONS  (PRN):  albuterol/ipratropium for Nebulization 3 milliLiter(s) Nebulizer every 6 hours PRN Shortness of Breath and/or Wheezing  magnesium hydroxide Suspension 30 milliLiter(s) Oral daily PRN Constipation  sodium chloride 0.65% Nasal 1 Spray(s) Both Nostrils three times a day PRN Nasal Congestion    Vital Signs Last 24 Hrs  T(F): 98.1 (30 Mar 2021 08:11), Max: 98.5 (29 Mar 2021 20:06)  HR: 65 (30 Mar 2021 08:11) (59 - 66)  BP: 137/82 (30 Mar 2021 08:11) (135/86 - 148/72)  RR: 16 (30 Mar 2021 08:11) (16 - 16)  SpO2: 94% (30 Mar 2021 08:11) (92% - 94%)    I&O's Summary    29 Mar 2021 07:01  -  30 Mar 2021 07:00  --------------------------------------------------------  IN: 0 mL / OUT: 1600 mL / NET: -1600 mL    30 Mar 2021 07:01  -  30 Mar 2021 11:11  --------------------------------------------------------  IN: 240 mL / OUT: 0 mL / NET: 240 mL    PHYSICAL EXAM:  General: NAD, A/O x 3  ENT: MMM  Neck: Supple, No JVD  Abdomen: Soft, Nontender, Nondistended  Extremities: b/l legs locked in extension with Ran braces, incisions examined and dressings changed yesterday, c/d/i, thighs soft b/l, skin warm and dry, DP 2+ b/l, EHL/FHL 5/5                                              RADIOLOGY & ADDITIONAL TESTS:    Care Discussed with Consultants/Other Providers:

## 2021-03-30 NOTE — PROGRESS NOTE ADULT - ASSESSMENT
60 year old morbidly obese male with  HTN, Afib on Eliquis, Cardiomyopathy (latest EF 53% - improved from 25%), s/p AICD, ERICK (cannot tolerate CPAP-noncompliant), HLD, BPH, arthritis, s/p fall 2/4/2021, discharged to rehab. After 2 weeks of rehab without improvement in gait, further work up was performed and he was found to have bilateral quadriceps tendon rupture. S/p repair on 3/22 with Orthopaedist Dr. Caro.    # acute hypoxic respiratory failure likely due to atelectasis/obesity - resolved  # ERICK w/ overnight desaturations  - suspect atelectasis. Able to wean O2 from 6L -->2 L just with incentive spirometry. Continue IS.   - CXR without acute abnormalities.  - Pt denies smoking hx.   - BNP mild elevation / ULN. He is euvolemic on exam.   - patient needs outpatient sleep study and he has no home CPAP machine - reportedly didn't tolerate study.   - duonebs PRN  - patient is medically stable for discharge    # Diastolic heart failure s/p AICD (Heart failure preserved ejection fraction improved) - chronic  # A fib - chronic  # HTN - stable  - appears euvolemic  - cont amiodarone, eliquis, entresto  - Cards also on consult. Recommend continued outpatient Cardiology f/u on discharge.     # BPH  - cont tamsulosin and finasteride    # B/l Quadriceps tendon rupture s/p repair 3/22  - Ortho: PT/OT in bed for now; Absolutely no leg flexion (6 weeks total); WBAT bilateral when patient becomes able to be OOB   - DVT ppx - on eliquis  - PT eval ordered - TITUS  - bowel regimen - recommend more aggressive in setting of constipation  - pain control  - no recent quinolone use.    # Hypothyroidism likely Hashimoto's thyroiditis   - TSH elevated to 18.90, Total T3 low at 72, Free thyroxine LLN at 1.0. elevated thyroperoxidase Ab  - Repeat TSH in  4-6 weeks (4/21)  - Endocrinology, Dr. Lau was consulted, On levothyroxine 100 mcg a day.  - As per cardio, titrate up on synthroid slowly since significant heart disease    # Morbid obesity  - nutrition consult placed    Dispo: awaiting auth

## 2021-03-30 NOTE — PROGRESS NOTE ADULT - ASSESSMENT
Patient is a 60y old  Male who presents with a chief complaint of bilateral quadriceps rupture (30 Mar 2021 08:51)  Pt POD # 8 s/p b/l quadriceps tendon repair  no events noted over night, pt doing well  pain well managed      pt doing well from orthopaedic standpoint    plan  - DVT prophylaxis  - decubitus prophylaxis  - PT in bed  - ABSOLUTELY NO B/L LE FLEXION FOR 6 WEEKS  - awaiting TITUS placement

## 2021-03-30 NOTE — PROGRESS NOTE ADULT - SUBJECTIVE AND OBJECTIVE BOX
Patient is a 60y old  Male who presents with a chief complaint of bilateral quadriceps rupture (29 Mar 2021 13:10)      Patient seen and examined at bedside.  No overnight events  No complaints this morning  Awaiting auth for [facility]    ALLERGIES:  No Known Allergies    MEDICATIONS  (STANDING):  acetaminophen   Tablet .. 975 milliGRAM(s) Oral every 8 hours  aMIOdarone    Tablet 200 milliGRAM(s) Oral daily  apixaban 5 milliGRAM(s) Oral two times a day  atorvastatin 40 milliGRAM(s) Oral at bedtime  carvedilol 25 milliGRAM(s) Oral two times a day  finasteride 5 milliGRAM(s) Oral daily  influenza   Vaccine 0.5 milliLiter(s) IntraMuscular once  levothyroxine 100 MICROGram(s) Oral daily  melatonin 3 milliGRAM(s) Oral at bedtime  ondansetron Injectable 4 milliGRAM(s) IV Push once  oxybutynin 10 milliGRAM(s) Oral daily  pantoprazole    Tablet 40 milliGRAM(s) Oral before breakfast  polyethylene glycol 3350 17 Gram(s) Oral at bedtime  sacubitril 49 mG/valsartan 51 mG 1 Tablet(s) Oral two times a day  senna 2 Tablet(s) Oral at bedtime  tamsulosin 0.4 milliGRAM(s) Oral at bedtime    MEDICATIONS  (PRN):  albuterol/ipratropium for Nebulization 3 milliLiter(s) Nebulizer every 6 hours PRN Shortness of Breath and/or Wheezing  magnesium hydroxide Suspension 30 milliLiter(s) Oral daily PRN Constipation  sodium chloride 0.65% Nasal 1 Spray(s) Both Nostrils three times a day PRN Nasal Congestion    Vital Signs Last 24 Hrs  T(F): 98.1 (30 Mar 2021 08:11), Max: 98.5 (29 Mar 2021 20:06)  HR: 65 (30 Mar 2021 08:11) (59 - 66)  BP: 137/82 (30 Mar 2021 08:11) (135/86 - 148/72)  RR: 16 (30 Mar 2021 08:11) (16 - 16)  SpO2: 94% (30 Mar 2021 08:11) (92% - 94%)  I&O's Summary    29 Mar 2021 07:01  -  30 Mar 2021 07:00  --------------------------------------------------------  IN: 0 mL / OUT: 1600 mL / NET: -1600 mL        PHYSICAL EXAM:  GENERAL: NAD  HENT:  Atraumatic, Normocephalic; No tonsillar erythema, exudates, or enlargement; Moist mucous membranes;   EYES: EOMI, PERRLA, conjunctiva and sclera clear, no lid-lag  NECK: Supple, No JVD, Normal thyroid  CHEST/LUNG: Clear to percussion bilaterally; No rales, rhonchi, wheezing, or rubs; normal respiratory effort, no intercostal retractions  HEART: Regular rate and rhythm; No murmurs, rubs, or gallops  ABDOMEN: Soft, Nontender, Nondistended; Bowel sounds present; No HSM  MUSCULOSKELETAL/EXTREMITIES:  2+ Peripheral Pulses, No clubbing, cyanosis, or peripheral edema; No digital cyanosis  SKIN: No rashes or lesions; normal texture and temperature  PSYCH: Appropriate affect, Alert & Oriented x 3    LABS:      Care Discussed with Consultants/Other Providers: Yes

## 2021-03-31 PROCEDURE — 99232 SBSQ HOSP IP/OBS MODERATE 35: CPT

## 2021-03-31 RX ADMIN — Medication 3 MILLIGRAM(S): at 22:11

## 2021-03-31 RX ADMIN — CARVEDILOL PHOSPHATE 25 MILLIGRAM(S): 80 CAPSULE, EXTENDED RELEASE ORAL at 17:11

## 2021-03-31 RX ADMIN — OXYCODONE HYDROCHLORIDE 10 MILLIGRAM(S): 5 TABLET ORAL at 18:57

## 2021-03-31 RX ADMIN — OXYCODONE HYDROCHLORIDE 10 MILLIGRAM(S): 5 TABLET ORAL at 22:10

## 2021-03-31 RX ADMIN — Medication 975 MILLIGRAM(S): at 13:24

## 2021-03-31 RX ADMIN — Medication 975 MILLIGRAM(S): at 23:11

## 2021-03-31 RX ADMIN — TAMSULOSIN HYDROCHLORIDE 0.4 MILLIGRAM(S): 0.4 CAPSULE ORAL at 22:11

## 2021-03-31 RX ADMIN — FINASTERIDE 5 MILLIGRAM(S): 5 TABLET, FILM COATED ORAL at 11:56

## 2021-03-31 RX ADMIN — Medication 975 MILLIGRAM(S): at 14:24

## 2021-03-31 RX ADMIN — OXYCODONE HYDROCHLORIDE 10 MILLIGRAM(S): 5 TABLET ORAL at 19:57

## 2021-03-31 RX ADMIN — Medication 975 MILLIGRAM(S): at 22:11

## 2021-03-31 RX ADMIN — Medication 975 MILLIGRAM(S): at 05:25

## 2021-03-31 RX ADMIN — SACUBITRIL AND VALSARTAN 1 TABLET(S): 24; 26 TABLET, FILM COATED ORAL at 17:11

## 2021-03-31 RX ADMIN — Medication 10 MILLIGRAM(S): at 11:56

## 2021-03-31 RX ADMIN — SACUBITRIL AND VALSARTAN 1 TABLET(S): 24; 26 TABLET, FILM COATED ORAL at 05:27

## 2021-03-31 RX ADMIN — APIXABAN 5 MILLIGRAM(S): 2.5 TABLET, FILM COATED ORAL at 05:27

## 2021-03-31 RX ADMIN — ATORVASTATIN CALCIUM 40 MILLIGRAM(S): 80 TABLET, FILM COATED ORAL at 22:11

## 2021-03-31 RX ADMIN — AMIODARONE HYDROCHLORIDE 200 MILLIGRAM(S): 400 TABLET ORAL at 05:24

## 2021-03-31 RX ADMIN — Medication 975 MILLIGRAM(S): at 05:55

## 2021-03-31 RX ADMIN — Medication 100 MICROGRAM(S): at 05:27

## 2021-03-31 RX ADMIN — CARVEDILOL PHOSPHATE 25 MILLIGRAM(S): 80 CAPSULE, EXTENDED RELEASE ORAL at 05:27

## 2021-03-31 RX ADMIN — APIXABAN 5 MILLIGRAM(S): 2.5 TABLET, FILM COATED ORAL at 17:12

## 2021-03-31 RX ADMIN — PANTOPRAZOLE SODIUM 40 MILLIGRAM(S): 20 TABLET, DELAYED RELEASE ORAL at 05:24

## 2021-03-31 RX ADMIN — SENNA PLUS 2 TABLET(S): 8.6 TABLET ORAL at 22:11

## 2021-03-31 NOTE — PROGRESS NOTE ADULT - ASSESSMENT
60 year old morbidly obese male with  HTN, Afib on Eliquis, Cardiomyopathy (latest EF 53% - improved from 25%), s/p AICD, ERICK (cannot tolerate CPAP-noncompliant), HLD, BPH, arthritis, s/p fall 2/4/2021, discharged to rehab. After 2 weeks of rehab without improvement in gait, further work up was performed and he was found to have bilateral quadriceps tendon rupture. S/p repair on 3/22 with Orthopaedist Dr. Caro.    # acute hypoxic respiratory failure likely due to atelectasis/obesity - resolved  # ERICK w/ overnight desaturations  - suspect atelectasis. Able to wean O2 from 6L -->2 L just with incentive spirometry. Continue IS.   - CXR without acute abnormalities.  - Pt denies smoking hx.   - BNP mild elevation / ULN. He is euvolemic on exam.   - patient needs outpatient sleep study and he has no home CPAP machine - reportedly didn't tolerate study.   - duonebs PRN  - patient is medically stable for discharge    # Diastolic heart failure s/p AICD (Heart failure preserved ejection fraction improved) - chronic  # A fib - chronic  # HTN - stable  - appears euvolemic  - cont amiodarone, eliquis, entresto  - Cards also on consult. Recommend continued outpatient Cardiology f/u on discharge.     # BPH  - cont tamsulosin and finasteride    # B/l Quadriceps tendon rupture s/p repair 3/22  - Ortho: PT/OT in bed for now; Absolutely no leg flexion (6 weeks total); WBAT bilateral when patient becomes able to be OOB   - DVT ppx - on eliquis  - PT eval ordered - TITUS  - bowel regimen - recommend more aggressive in setting of constipation  - pain control  - no recent quinolone use.    # Hypothyroidism likely Hashimoto's thyroiditis   - TSH elevated to 18.90, Total T3 low at 72, Free thyroxine LLN at 1.0. elevated thyroperoxidase Ab  - Repeat TSH in  4-6 weeks (4/21)  - Endocrinology, Dr. Lau was consulted, On levothyroxine 100 mcg a day.  - As per cardio, titrate up on synthroid slowly since significant heart disease    # Morbid obesity  - nutrition consult placed    Dispo: awaiting auth  Patient is medically stable for discharge

## 2021-03-31 NOTE — PROGRESS NOTE ADULT - ASSESSMENT
Patient is a 60y old  Male who presents with a chief complaint of bilateral quadriceps rupture (31 Mar 2021 08:47)  pt POD # 9 s/p b/l quadriceps tendon repair  no events noted over night  pt feels well, denies pain     pt doing well from orthopaedic standpoint    plan  - DVT prophylaxis  - decubitus prophylaxis  - PT in bed  - ABSOLUTELY NO B/L LE FLEXION FOR 6 WEEKS  - awaiting TITUS placement

## 2021-03-31 NOTE — PROGRESS NOTE ADULT - SUBJECTIVE AND OBJECTIVE BOX
Patient is a 60y old  Male who presents with a chief complaint of bilateral quadriceps rupture (31 Mar 2021 08:47)  pt POD # 9 s/p b/l quadriceps tendon repair  no events noted over night  pt feels well, denies pain     Patient seen and examined at bedside    ALLERGIES:  No Known Allergies    MEDICATIONS  (STANDING):  acetaminophen   Tablet .. 975 milliGRAM(s) Oral every 8 hours  aMIOdarone    Tablet 200 milliGRAM(s) Oral daily  apixaban 5 milliGRAM(s) Oral two times a day  atorvastatin 40 milliGRAM(s) Oral at bedtime  carvedilol 25 milliGRAM(s) Oral two times a day  finasteride 5 milliGRAM(s) Oral daily  influenza   Vaccine 0.5 milliLiter(s) IntraMuscular once  levothyroxine 100 MICROGram(s) Oral daily  melatonin 3 milliGRAM(s) Oral at bedtime  ondansetron Injectable 4 milliGRAM(s) IV Push once  oxybutynin 10 milliGRAM(s) Oral daily  pantoprazole    Tablet 40 milliGRAM(s) Oral before breakfast  polyethylene glycol 3350 17 Gram(s) Oral at bedtime  sacubitril 49 mG/valsartan 51 mG 1 Tablet(s) Oral two times a day  senna 2 Tablet(s) Oral at bedtime  tamsulosin 0.4 milliGRAM(s) Oral at bedtime    MEDICATIONS  (PRN):  albuterol/ipratropium for Nebulization 3 milliLiter(s) Nebulizer every 6 hours PRN Shortness of Breath and/or Wheezing  magnesium hydroxide Suspension 30 milliLiter(s) Oral daily PRN Constipation  oxyCODONE    IR 5 milliGRAM(s) Oral every 3 hours PRN Moderate Pain (4 - 6)  oxyCODONE    IR 10 milliGRAM(s) Oral every 3 hours PRN Severe Pain (7 - 10)  sodium chloride 0.65% Nasal 1 Spray(s) Both Nostrils three times a day PRN Nasal Congestion    Vital Signs Last 24 Hrs  T(F): 97.7 (31 Mar 2021 08:10), Max: 98.4 (30 Mar 2021 16:21)  HR: 63 (31 Mar 2021 08:10) (63 - 69)  BP: 155/80 (31 Mar 2021 08:10) (139/79 - 155/80)  RR: 16 (31 Mar 2021 08:10) (15 - 17)  SpO2: 92% (31 Mar 2021 08:10) (92% - 97%)    I&O's Summary    30 Mar 2021 07:01  -  31 Mar 2021 07:00  --------------------------------------------------------  IN: 480 mL / OUT: 1400 mL / NET: -920 mL    31 Mar 2021 07:01  -  31 Mar 2021 09:52  --------------------------------------------------------  IN: 0 mL / OUT: 200 mL / NET: -200 mL    PHYSICAL EXAM:  General: NAD, A/O x 3  ENT: MMM  Neck: Supple, No JVD  Abdomen: Soft, Nontender, Nondistended  Extremities: b/l LE locked in extension with Ran braces, thigh soft b/l, skin warm, dry and intact b/l, EHL/FHL 5/5 b/l, DP 2+ b/l

## 2021-03-31 NOTE — PROGRESS NOTE ADULT - SUBJECTIVE AND OBJECTIVE BOX
F/U Note:    60M POD #9 from B/L quadriceps repair, S/P rupture    Interval hx:  -patient comfortable in bed, offers no complaints     ROS:   patient offers no complaints at this time      Physical Exam:    NEURO: Awake/alert  CV: (+) S1/S2, irregularly irregular, no MRG   RESp: CTA bl  GI: soft, non tender  B/L leg immobilizers in place     LABS:      No new labs today            PLAN:    -PTO in bed  -per ortho note absolutely NO flexion, patient with leg immobilizers in place to prevent  -pain control  -serial exams  -eliquis for DVT prophylaxis and afib tx  -f/u Am labs

## 2021-03-31 NOTE — PROGRESS NOTE ADULT - SUBJECTIVE AND OBJECTIVE BOX
Patient is a 60y old  Male who presents with a chief complaint of bilateral quadriceps rupture (30 Mar 2021 09:00)      Patient seen and examined at bedside.  No overnight events  No complaints this morning  Awaiting auth    ALLERGIES:  No Known Allergies    MEDICATIONS  (STANDING):  acetaminophen   Tablet .. 975 milliGRAM(s) Oral every 8 hours  aMIOdarone    Tablet 200 milliGRAM(s) Oral daily  apixaban 5 milliGRAM(s) Oral two times a day  atorvastatin 40 milliGRAM(s) Oral at bedtime  carvedilol 25 milliGRAM(s) Oral two times a day  finasteride 5 milliGRAM(s) Oral daily  influenza   Vaccine 0.5 milliLiter(s) IntraMuscular once  levothyroxine 100 MICROGram(s) Oral daily  melatonin 3 milliGRAM(s) Oral at bedtime  ondansetron Injectable 4 milliGRAM(s) IV Push once  oxybutynin 10 milliGRAM(s) Oral daily  pantoprazole    Tablet 40 milliGRAM(s) Oral before breakfast  polyethylene glycol 3350 17 Gram(s) Oral at bedtime  sacubitril 49 mG/valsartan 51 mG 1 Tablet(s) Oral two times a day  senna 2 Tablet(s) Oral at bedtime  tamsulosin 0.4 milliGRAM(s) Oral at bedtime    MEDICATIONS  (PRN):  albuterol/ipratropium for Nebulization 3 milliLiter(s) Nebulizer every 6 hours PRN Shortness of Breath and/or Wheezing  magnesium hydroxide Suspension 30 milliLiter(s) Oral daily PRN Constipation  oxyCODONE    IR 5 milliGRAM(s) Oral every 3 hours PRN Moderate Pain (4 - 6)  oxyCODONE    IR 10 milliGRAM(s) Oral every 3 hours PRN Severe Pain (7 - 10)  sodium chloride 0.65% Nasal 1 Spray(s) Both Nostrils three times a day PRN Nasal Congestion    Vital Signs Last 24 Hrs  T(F): 97.7 (31 Mar 2021 08:10), Max: 98.4 (30 Mar 2021 16:21)  HR: 63 (31 Mar 2021 08:10) (63 - 69)  BP: 155/80 (31 Mar 2021 08:10) (139/79 - 155/80)  RR: 16 (31 Mar 2021 08:10) (15 - 17)  SpO2: 92% (31 Mar 2021 08:10) (92% - 97%)  I&O's Summary    30 Mar 2021 07:01  -  31 Mar 2021 07:00  --------------------------------------------------------  IN: 480 mL / OUT: 1400 mL / NET: -920 mL          PHYSICAL EXAM:  GENERAL: NAD, morbidly obese  HENT:  Atraumatic, Normocephalic; No tonsillar erythema, exudates, or enlargement; Moist mucous membranes;   EYES: EOMI, PERRLA, conjunctiva and sclera clear, no lid-lag  NECK: Supple, No JVD, Normal thyroid  CHEST/LUNG: Clear to percussion bilaterally; No rales, rhonchi, wheezing, or rubs; normal respiratory effort, no intercostal retractions  HEART: Regular rate and rhythm; No murmurs, rubs, or gallops  ABDOMEN: Soft, Nontender, Nondistended; Bowel sounds present; No HSM  MUSCULOSKELETAL/EXTREMITIES:  2+ Peripheral Pulses, No clubbing, cyanosis, or peripheral edema; No digital cyanosis  SKIN: No rashes or lesions; normal texture and temperature  PSYCH: Appropriate affect, Alert & Oriented x 3    LABS:          Care Discussed with Consultants/Other Providers: Yes

## 2021-03-31 NOTE — CHART NOTE - NSCHARTNOTEFT_GEN_A_CORE
Nutrition Follow Up Note  Hospital Course (Per Electronic Medical Record):   Source: Medical Record [X] Patient [X]  Nursing Staff [X]     Diet: Regular    Patient tolerating diet consuming 100% of meals , No complaints at  present .    Current Weight: (3/31) 282.1/128kg , admit weight noted (3/22) 282/127.5kg     Pertinent Medications: MEDICATIONS  (STANDING):  acetaminophen   Tablet .. 975 milliGRAM(s) Oral every 8 hours  aMIOdarone    Tablet 200 milliGRAM(s) Oral daily  apixaban 5 milliGRAM(s) Oral two times a day  atorvastatin 40 milliGRAM(s) Oral at bedtime  carvedilol 25 milliGRAM(s) Oral two times a day  finasteride 5 milliGRAM(s) Oral daily  influenza   Vaccine 0.5 milliLiter(s) IntraMuscular once  levothyroxine 100 MICROGram(s) Oral daily  melatonin 3 milliGRAM(s) Oral at bedtime  ondansetron Injectable 4 milliGRAM(s) IV Push once  oxybutynin 10 milliGRAM(s) Oral daily  pantoprazole    Tablet 40 milliGRAM(s) Oral before breakfast  polyethylene glycol 3350 17 Gram(s) Oral at bedtime  sacubitril 49 mG/valsartan 51 mG 1 Tablet(s) Oral two times a day  senna 2 Tablet(s) Oral at bedtime  tamsulosin 0.4 milliGRAM(s) Oral at bedtime    MEDICATIONS  (PRN):  albuterol/ipratropium for Nebulization 3 milliLiter(s) Nebulizer every 6 hours PRN Shortness of Breath and/or Wheezing  magnesium hydroxide Suspension 30 milliLiter(s) Oral daily PRN Constipation  oxyCODONE    IR 5 milliGRAM(s) Oral every 3 hours PRN Moderate Pain (4 - 6)  oxyCODONE    IR 10 milliGRAM(s) Oral every 3 hours PRN Severe Pain (7 - 10)  sodium chloride 0.65% Nasal 1 Spray(s) Both Nostrils three times a day PRN Nasal Congestion      Pertinent Labs:  (3/26) H/H 11.7/35.5, Na143,K3.9, BUN22, Cr1.06, [id number]        Skin: intact    Edema: none     Last BM: (3/27)     Estimated Needs:   [X] No Change since Previous Assessment     Previous Nutrition Diagnosis: Obesity     Nutrition Diagnosis is [X] Ongoing, encouraged weight loss & portion control        New Nutrition Diagnosis: [X] Not Applicable      Interventions:   1. change diet to DASH/TLC        Monitoring & Evaluation: will monitor:  [X] Weights   [X] PO Intake   [X] Follow Up (Per Protocol)  [X] Tolerance to Diet Prescription       RD to follow as per Nutrition protocol  Chelita Norman RDN

## 2021-04-01 PROCEDURE — 99232 SBSQ HOSP IP/OBS MODERATE 35: CPT

## 2021-04-01 RX ADMIN — TAMSULOSIN HYDROCHLORIDE 0.4 MILLIGRAM(S): 0.4 CAPSULE ORAL at 22:52

## 2021-04-01 RX ADMIN — Medication 975 MILLIGRAM(S): at 06:06

## 2021-04-01 RX ADMIN — Medication 975 MILLIGRAM(S): at 07:07

## 2021-04-01 RX ADMIN — SACUBITRIL AND VALSARTAN 1 TABLET(S): 24; 26 TABLET, FILM COATED ORAL at 06:06

## 2021-04-01 RX ADMIN — Medication 975 MILLIGRAM(S): at 14:18

## 2021-04-01 RX ADMIN — Medication 3 MILLIGRAM(S): at 22:52

## 2021-04-01 RX ADMIN — Medication 975 MILLIGRAM(S): at 22:52

## 2021-04-01 RX ADMIN — AMIODARONE HYDROCHLORIDE 200 MILLIGRAM(S): 400 TABLET ORAL at 06:11

## 2021-04-01 RX ADMIN — FINASTERIDE 5 MILLIGRAM(S): 5 TABLET, FILM COATED ORAL at 11:02

## 2021-04-01 RX ADMIN — CARVEDILOL PHOSPHATE 25 MILLIGRAM(S): 80 CAPSULE, EXTENDED RELEASE ORAL at 06:06

## 2021-04-01 RX ADMIN — PANTOPRAZOLE SODIUM 40 MILLIGRAM(S): 20 TABLET, DELAYED RELEASE ORAL at 06:06

## 2021-04-01 RX ADMIN — SACUBITRIL AND VALSARTAN 1 TABLET(S): 24; 26 TABLET, FILM COATED ORAL at 17:34

## 2021-04-01 RX ADMIN — APIXABAN 5 MILLIGRAM(S): 2.5 TABLET, FILM COATED ORAL at 17:34

## 2021-04-01 RX ADMIN — Medication 100 MICROGRAM(S): at 06:06

## 2021-04-01 RX ADMIN — Medication 975 MILLIGRAM(S): at 15:48

## 2021-04-01 RX ADMIN — OXYCODONE HYDROCHLORIDE 5 MILLIGRAM(S): 5 TABLET ORAL at 20:40

## 2021-04-01 RX ADMIN — APIXABAN 5 MILLIGRAM(S): 2.5 TABLET, FILM COATED ORAL at 06:06

## 2021-04-01 RX ADMIN — CARVEDILOL PHOSPHATE 25 MILLIGRAM(S): 80 CAPSULE, EXTENDED RELEASE ORAL at 17:34

## 2021-04-01 RX ADMIN — OXYCODONE HYDROCHLORIDE 10 MILLIGRAM(S): 5 TABLET ORAL at 23:50

## 2021-04-01 RX ADMIN — Medication 10 MILLIGRAM(S): at 11:02

## 2021-04-01 RX ADMIN — SENNA PLUS 2 TABLET(S): 8.6 TABLET ORAL at 22:52

## 2021-04-01 RX ADMIN — OXYCODONE HYDROCHLORIDE 5 MILLIGRAM(S): 5 TABLET ORAL at 21:38

## 2021-04-01 RX ADMIN — Medication 975 MILLIGRAM(S): at 23:52

## 2021-04-01 RX ADMIN — ATORVASTATIN CALCIUM 40 MILLIGRAM(S): 80 TABLET, FILM COATED ORAL at 22:52

## 2021-04-01 NOTE — PROGRESS NOTE ADULT - SUBJECTIVE AND OBJECTIVE BOX
F/U Note:    60M POD #10 from B/L quadriceps repair, S/P rupture    Interval hx:  -patient comfortable in bed, offers no complaints     ROS:   patient offers no complaints at this time      Physical Exam:    NEURO: Awake/alert  CV: (+) S1/S2, irregularly irregular, no MRG   RESp: CTA bl  GI: soft, non tender  B/L leg immobilizers in place     LABS:      No new labs today            PLAN:    -PTO in bed  -per ortho note they would like to start with WBAT exercise, but patient has not been able to toerlate  -pain control  -serial exams  -eliquis for DVT prophylaxis and afib tx

## 2021-04-01 NOTE — PROGRESS NOTE ADULT - ASSESSMENT
60 year old morbidly obese male with  HTN, Afib on Eliquis, Cardiomyopathy (latest EF 53% - improved from 25%), s/p AICD, ERICK (cannot tolerate CPAP-noncompliant), HLD, BPH, arthritis, s/p fall 2/4/2021, discharged to rehab. After 2 weeks of rehab without improvement in gait, further work up was performed and he was found to have bilateral quadriceps tendon rupture. S/p repair on 3/22 with Orthopaedist Dr. Caro.    # acute hypoxic respiratory failure likely due to atelectasis/obesity - resolved  # ERICK w/ overnight desaturations  - suspect atelectasis. Able to wean O2 from 6L -->2 L just with incentive spirometry. Continue IS.   - CXR without acute abnormalities.  - Pt denies smoking hx.   - BNP mild elevation / ULN. He is euvolemic on exam.   - patient needs outpatient sleep study and he has no home CPAP machine - reportedly didn't tolerate study.   - duonebs PRN  - patient is medically stable for discharge    # Diastolic heart failure s/p AICD (Heart failure preserved ejection fraction improved) - chronic  # A fib - chronic  # HTN - stable  - appears euvolemic  - cont amiodarone, eliquis, entresto  - Cards also on consult. Recommend continued outpatient Cardiology f/u on discharge.     # BPH  - cont tamsulosin and finasteride    # B/l Quadriceps tendon rupture s/p repair 3/22  - Ortho: PT/OT in bed for now; Absolutely no leg flexion (6 weeks total); WBAT bilateral when patient becomes able to be OOB; B/L leg brace   - DVT ppx - on eliquis  - PT eval ordered - [facility]  - bowel regimen - recommend more aggressive in setting of constipation  - pain control  - no recent quinolone use.    # Hypothyroidism likely Hashimoto's thyroiditis   - TSH elevated to 18.90, Total T3 low at 72, Free thyroxine LLN at 1.0. elevated thyroperoxidase Ab  - Repeat TSH in  4-6 weeks (4/21)  - Endocrinology, Dr. Lau was consulted, On levothyroxine 100 mcg a day.  - As per cardio, titrate up on synthroid slowly since significant heart disease    # Morbid obesity  - nutrition consult placed    Dispo: awaiting auth  Patient is medically stable for discharge

## 2021-04-01 NOTE — PROGRESS NOTE ADULT - SUBJECTIVE AND OBJECTIVE BOX
Patient is a 60y old  Male who presents with a chief complaint of bilateral quadriceps rupture (31 Mar 2021 19:36)      Patient seen and examined at bedside.  No overnight events  No complaints this morning  Awaiting auth    ALLERGIES:  No Known Allergies    MEDICATIONS  (STANDING):  acetaminophen   Tablet .. 975 milliGRAM(s) Oral every 8 hours  aMIOdarone    Tablet 200 milliGRAM(s) Oral daily  apixaban 5 milliGRAM(s) Oral two times a day  atorvastatin 40 milliGRAM(s) Oral at bedtime  carvedilol 25 milliGRAM(s) Oral two times a day  finasteride 5 milliGRAM(s) Oral daily  influenza   Vaccine 0.5 milliLiter(s) IntraMuscular once  levothyroxine 100 MICROGram(s) Oral daily  melatonin 3 milliGRAM(s) Oral at bedtime  ondansetron Injectable 4 milliGRAM(s) IV Push once  oxybutynin 10 milliGRAM(s) Oral daily  pantoprazole    Tablet 40 milliGRAM(s) Oral before breakfast  polyethylene glycol 3350 17 Gram(s) Oral at bedtime  sacubitril 49 mG/valsartan 51 mG 1 Tablet(s) Oral two times a day  senna 2 Tablet(s) Oral at bedtime  tamsulosin 0.4 milliGRAM(s) Oral at bedtime    MEDICATIONS  (PRN):  albuterol/ipratropium for Nebulization 3 milliLiter(s) Nebulizer every 6 hours PRN Shortness of Breath and/or Wheezing  magnesium hydroxide Suspension 30 milliLiter(s) Oral daily PRN Constipation  oxyCODONE    IR 10 milliGRAM(s) Oral every 3 hours PRN Severe Pain (7 - 10)  oxyCODONE    IR 5 milliGRAM(s) Oral every 3 hours PRN Moderate Pain (4 - 6)  sodium chloride 0.65% Nasal 1 Spray(s) Both Nostrils three times a day PRN Nasal Congestion    Vital Signs Last 24 Hrs  T(F): 97.8 (01 Apr 2021 05:00), Max: 98.5 (31 Mar 2021 20:26)  HR: 67 (01 Apr 2021 06:05) (60 - 88)  BP: 137/70 (01 Apr 2021 06:05) (124/69 - 145/72)  RR: 16 (01 Apr 2021 05:00) (16 - 16)  SpO2: 93% (01 Apr 2021 05:00) (93% - 94%)  I&O's Summary    31 Mar 2021 07:01  -  01 Apr 2021 07:00  --------------------------------------------------------  IN: 580 mL / OUT: 1550 mL / NET: -970 mL    PHYSICAL EXAM:  GENERAL: NAD  HENT:  Atraumatic, Normocephalic; No tonsillar erythema, exudates, or enlargement; Moist mucous membranes;   EYES: EOMI, PERRLA, conjunctiva and sclera clear, no lid-lag  NECK: Supple, No JVD, Normal thyroid  NERVOUS SYSTEM:  CN II - XII intact; Sensation intact; Motor Strength 5/5 B/L upper and lower extremities  CHEST/LUNG: Clear to percussion bilaterally; No rales, rhonchi, wheezing, or rubs; normal respiratory effort, no intercostal retractions  HEART: Regular rate and rhythm; No murmurs, rubs, or gallops  ABDOMEN: Soft, Nontender, Nondistended; Bowel sounds present; No HSM  MUSCULOSKELETAL/EXTREMITIES:  2+ Peripheral Pulses, No clubbing, cyanosis, or peripheral edema; No digital cyanosis; Braces on both legs  SKIN: No rashes or lesions; normal texture and temperature  PSYCH: Appropriate affect, Alert & Oriented x 3    LABS:      Care Discussed with Consultants/Other Providers: Yes

## 2021-04-01 NOTE — PROGRESS NOTE ADULT - SUBJECTIVE AND OBJECTIVE BOX
s/P Bilateral Quadriceps Repair    Patient was seen by me this am .   There is no change in the patients condition.     Patient is without complaints.   His pain is manageable.   He is tolerating a diet, voiding, + BM s ,  .        PLan:   Continue to have PT /OT at bedrest             Would like to see him WBAT bilaterally but his size continues to be an issue and we do not want re injury               Eliquis for DVT prophylaxis                Will again change dressing tomorrow and check incisions

## 2021-04-02 ENCOUNTER — TRANSCRIPTION ENCOUNTER (OUTPATIENT)
Age: 61
End: 2021-04-02

## 2021-04-02 VITALS
OXYGEN SATURATION: 91 % | RESPIRATION RATE: 17 BRPM | SYSTOLIC BLOOD PRESSURE: 135 MMHG | TEMPERATURE: 97 F | DIASTOLIC BLOOD PRESSURE: 70 MMHG | HEART RATE: 68 BPM

## 2021-04-02 LAB — SARS-COV-2 RNA SPEC QL NAA+PROBE: SIGNIFICANT CHANGE UP

## 2021-04-02 PROCEDURE — 83880 ASSAY OF NATRIURETIC PEPTIDE: CPT

## 2021-04-02 PROCEDURE — 97110 THERAPEUTIC EXERCISES: CPT

## 2021-04-02 PROCEDURE — 86803 HEPATITIS C AB TEST: CPT

## 2021-04-02 PROCEDURE — 86376 MICROSOMAL ANTIBODY EACH: CPT

## 2021-04-02 PROCEDURE — 84439 ASSAY OF FREE THYROXINE: CPT

## 2021-04-02 PROCEDURE — U0005: CPT

## 2021-04-02 PROCEDURE — 87635 SARS-COV-2 COVID-19 AMP PRB: CPT

## 2021-04-02 PROCEDURE — 71045 X-RAY EXAM CHEST 1 VIEW: CPT

## 2021-04-02 PROCEDURE — 97535 SELF CARE MNGMENT TRAINING: CPT

## 2021-04-02 PROCEDURE — 85027 COMPLETE CBC AUTOMATED: CPT

## 2021-04-02 PROCEDURE — 97166 OT EVAL MOD COMPLEX 45 MIN: CPT

## 2021-04-02 PROCEDURE — 36415 COLL VENOUS BLD VENIPUNCTURE: CPT

## 2021-04-02 PROCEDURE — 94640 AIRWAY INHALATION TREATMENT: CPT

## 2021-04-02 PROCEDURE — 84480 ASSAY TRIIODOTHYRONINE (T3): CPT

## 2021-04-02 PROCEDURE — 80048 BASIC METABOLIC PNL TOTAL CA: CPT

## 2021-04-02 PROCEDURE — 86769 SARS-COV-2 COVID-19 ANTIBODY: CPT

## 2021-04-02 PROCEDURE — U0003: CPT

## 2021-04-02 PROCEDURE — 99239 HOSP IP/OBS DSCHRG MGMT >30: CPT

## 2021-04-02 PROCEDURE — 84443 ASSAY THYROID STIM HORMONE: CPT

## 2021-04-02 RX ORDER — PANTOPRAZOLE SODIUM 20 MG/1
1 TABLET, DELAYED RELEASE ORAL
Qty: 0 | Refills: 0 | DISCHARGE
Start: 2021-04-02

## 2021-04-02 RX ORDER — MAGNESIUM HYDROXIDE 400 MG/1
30 TABLET, CHEWABLE ORAL
Qty: 0 | Refills: 0 | DISCHARGE
Start: 2021-04-02

## 2021-04-02 RX ORDER — OXYCODONE HYDROCHLORIDE 5 MG/1
1 TABLET ORAL
Qty: 0 | Refills: 0 | DISCHARGE
Start: 2021-04-02

## 2021-04-02 RX ORDER — POLYETHYLENE GLYCOL 3350 17 G/17G
17 POWDER, FOR SOLUTION ORAL
Qty: 0 | Refills: 0 | DISCHARGE
Start: 2021-04-02

## 2021-04-02 RX ORDER — SENNA PLUS 8.6 MG/1
2 TABLET ORAL
Qty: 0 | Refills: 0 | DISCHARGE
Start: 2021-04-02

## 2021-04-02 RX ORDER — ACETAMINOPHEN 500 MG
3 TABLET ORAL
Qty: 0 | Refills: 0 | DISCHARGE
Start: 2021-04-02

## 2021-04-02 RX ORDER — IPRATROPIUM/ALBUTEROL SULFATE 18-103MCG
3 AEROSOL WITH ADAPTER (GRAM) INHALATION
Qty: 0 | Refills: 0 | DISCHARGE
Start: 2021-04-02

## 2021-04-02 RX ORDER — TRAMADOL HYDROCHLORIDE 50 MG/1
0 TABLET ORAL
Qty: 0 | Refills: 0 | DISCHARGE

## 2021-04-02 RX ORDER — SODIUM CHLORIDE 0.65 %
0 AEROSOL, SPRAY (ML) NASAL
Qty: 0 | Refills: 0 | DISCHARGE
Start: 2021-04-02

## 2021-04-02 RX ADMIN — CARVEDILOL PHOSPHATE 25 MILLIGRAM(S): 80 CAPSULE, EXTENDED RELEASE ORAL at 05:26

## 2021-04-02 RX ADMIN — Medication 975 MILLIGRAM(S): at 06:26

## 2021-04-02 RX ADMIN — Medication 975 MILLIGRAM(S): at 13:56

## 2021-04-02 RX ADMIN — Medication 975 MILLIGRAM(S): at 14:50

## 2021-04-02 RX ADMIN — FINASTERIDE 5 MILLIGRAM(S): 5 TABLET, FILM COATED ORAL at 13:56

## 2021-04-02 RX ADMIN — OXYCODONE HYDROCHLORIDE 10 MILLIGRAM(S): 5 TABLET ORAL at 00:50

## 2021-04-02 RX ADMIN — CARVEDILOL PHOSPHATE 25 MILLIGRAM(S): 80 CAPSULE, EXTENDED RELEASE ORAL at 17:12

## 2021-04-02 RX ADMIN — SACUBITRIL AND VALSARTAN 1 TABLET(S): 24; 26 TABLET, FILM COATED ORAL at 17:12

## 2021-04-02 RX ADMIN — Medication 10 MILLIGRAM(S): at 13:56

## 2021-04-02 RX ADMIN — AMIODARONE HYDROCHLORIDE 200 MILLIGRAM(S): 400 TABLET ORAL at 05:26

## 2021-04-02 RX ADMIN — APIXABAN 5 MILLIGRAM(S): 2.5 TABLET, FILM COATED ORAL at 05:26

## 2021-04-02 RX ADMIN — APIXABAN 5 MILLIGRAM(S): 2.5 TABLET, FILM COATED ORAL at 17:12

## 2021-04-02 RX ADMIN — Medication 100 MICROGRAM(S): at 05:26

## 2021-04-02 RX ADMIN — PANTOPRAZOLE SODIUM 40 MILLIGRAM(S): 20 TABLET, DELAYED RELEASE ORAL at 05:26

## 2021-04-02 RX ADMIN — SACUBITRIL AND VALSARTAN 1 TABLET(S): 24; 26 TABLET, FILM COATED ORAL at 05:26

## 2021-04-02 RX ADMIN — Medication 975 MILLIGRAM(S): at 05:26

## 2021-04-02 NOTE — DISCHARGE NOTE NURSING/CASE MANAGEMENT/SOCIAL WORK - NSDCFUADDAPPT_GEN_ALL_CORE_FT
call Dr Caro s  office for appt. in two weeks     Make appointment with his cardiologist 1 - 2 weeks after discharge.

## 2021-04-02 NOTE — PROGRESS NOTE ADULT - ASSESSMENT
59 yo male s/p BL quad tendon repair.  Doing well.   Awaiting rehab.   Continue Knee immobilizers at all times.

## 2021-04-02 NOTE — PROGRESS NOTE ADULT - PROVIDER SPECIALTY LIST ADULT
Critical Care
Hospitalist
Hospitalist
Orthopedics
Critical Care
Hospitalist
Hospitalist
Cardiology
Hospitalist
Orthopedics
Hospitalist
Orthopedics
Orthopedics
Hospitalist
Cardiology

## 2021-04-02 NOTE — DISCHARGE NOTE NURSING/CASE MANAGEMENT/SOCIAL WORK - NSCORESITESY/N_GEN_A_CORE_RD
OT eval: 13:00-13:30 Pt received semi ontiveros in bed in NAD +tele +IV drip HR: 52 bpm. Pt agreeable to OT eval. Yes

## 2021-04-02 NOTE — PROGRESS NOTE ADULT - ASSESSMENT
60 year old morbidly obese male with  HTN, Afib on Eliquis, Cardiomyopathy (latest EF 53% - improved from 25%), s/p AICD, ERICK (cannot tolerate CPAP-noncompliant), HLD, BPH, arthritis, s/p fall 2/4/2021, discharged to rehab. After 2 weeks of rehab without improvement in gait, further work up was performed and he was found to have bilateral quadriceps tendon rupture. S/p repair on 3/22 with Orthopaedist Dr. Caro.    # acute hypoxic respiratory failure likely due to atelectasis/obesity - resolved  # ERICK w/ overnight desaturations  - suspect atelectasis. Able to wean O2 from 6L -->2 L just with incentive spirometry. Continue IS.   - CXR without acute abnormalities.  - Pt denies smoking hx.   - BNP mild elevation / ULN. He is euvolemic on exam.   - patient needs outpatient sleep study and he has no home CPAP machine - reportedly didn't tolerate study.   - duonebs PRN  - patient is medically stable for discharge    # Diastolic heart failure s/p AICD (Heart failure preserved ejection fraction improved) - chronic  # A fib - chronic  # HTN - stable  - appears euvolemic  - cont amiodarone, eliquis, entresto  - Cards also on consult. Recommend continued outpatient Cardiology f/u on discharge.     # BPH  - cont tamsulosin and finasteride    # B/l Quadriceps tendon rupture s/p repair 3/22  - Ortho: PT/OT in bed for now; Absolutely no leg flexion (6 weeks total); WBAT bilateral when patient becomes able to be OOB; B/L leg brace   - DVT ppx - on eliquis  - PT eval ordered - [facility]  - bowel regimen - recommend more aggressive in setting of constipation  - pain control  - no recent quinolone use.    # Hypothyroidism likely Hashimoto's thyroiditis   - TSH elevated to 18.90, Total T3 low at 72, Free thyroxine LLN at 1.0. elevated thyroperoxidase Ab  - Repeat TSH in  4-6 weeks (4/21)  - Endocrinology, Dr. Lau was consulted, On levothyroxine 100 mcg a day.  - As per cardio, titrate up on synthroid slowly since significant heart disease    # Morbid obesity  - nutrition consult placed    Dispo: discharge to [facility]  60 year old morbidly obese male with  HTN, Afib on Eliquis, Cardiomyopathy (latest EF 53% - improved from 25%), s/p AICD, ERICK (cannot tolerate CPAP-noncompliant), HLD, BPH, arthritis, s/p fall 2/4/2021, discharged to rehab. After 2 weeks of rehab without improvement in gait, further work up was performed and he was found to have bilateral quadriceps tendon rupture. S/p repair on 3/22 with Orthopaedist Dr. Caro.    # acute hypoxic respiratory failure likely due to atelectasis/obesity - resolved  # ERICK w/ overnight desaturations  - suspect atelectasis. Able to wean O2 just with incentive spirometry. Continue IS.   - CXR without acute abnormalities.  - Pt denies smoking hx.   - BNP mild elevation / ULN. He is euvolemic on exam.   - patient needs outpatient sleep study and he has no home CPAP machine - reportedly didn't tolerate study.   - duonebs PRN  - patient is medically stable for discharge    # Diastolic heart failure s/p AICD (Heart failure preserved ejection fraction improved) - chronic  # A fib - chronic  # HTN - stable  - appears euvolemic  - cont amiodarone, eliquis, entresto  - Cards also on consult. Recommend continued outpatient Cardiology f/u on discharge.     # BPH  - cont tamsulosin and finasteride    # B/l Quadriceps tendon rupture s/p repair 3/22  - Ortho: PT/OT in bed for now; Absolutely no leg flexion (6 weeks total); WBAT bilateral when patient becomes able to be OOB; B/L leg brace   - DVT ppx - on eliquis  - PT eval ordered - [facility]  - bowel regimen - recommend more aggressive in setting of constipation  - pain control  - no recent quinolone use.    # Hypothyroidism likely Hashimoto's thyroiditis   - TSH elevated to 18.90, Total T3 low at 72, Free thyroxine LLN at 1.0. elevated thyroperoxidase Ab  - Repeat TSH in  4-6 weeks (4/21)  - Endocrinology, Dr. Lau was consulted, On levothyroxine 100 mcg a day.  - As per cardio, titrate up on synthroid slowly since significant heart disease    # Morbid obesity  - nutrition consult placed    Dispo: discharge to [facility]

## 2021-04-02 NOTE — PROGRESS NOTE ADULT - REASON FOR ADMISSION
bilateral quadriceps rupture

## 2021-04-02 NOTE — PROGRESS NOTE ADULT - SUBJECTIVE AND OBJECTIVE BOX
Patient is a 60y old  Male who presents with a chief complaint of bilateral quadriceps rupture (02 Apr 2021 13:17)      24 HOUR EVENTS:  No overnight events reported.     SUBJECTIVE:  Patient seen and examined at bedside. Feels well. Not wanting to go to rehab today.     ALLERGIES:  No Known Allergies    MEDICATIONS  (STANDING):  acetaminophen   Tablet .. 975 milliGRAM(s) Oral every 8 hours  aMIOdarone    Tablet 200 milliGRAM(s) Oral daily  apixaban 5 milliGRAM(s) Oral two times a day  atorvastatin 40 milliGRAM(s) Oral at bedtime  carvedilol 25 milliGRAM(s) Oral two times a day  finasteride 5 milliGRAM(s) Oral daily  influenza   Vaccine 0.5 milliLiter(s) IntraMuscular once  levothyroxine 100 MICROGram(s) Oral daily  melatonin 3 milliGRAM(s) Oral at bedtime  ondansetron Injectable 4 milliGRAM(s) IV Push once  oxybutynin 10 milliGRAM(s) Oral daily  pantoprazole    Tablet 40 milliGRAM(s) Oral before breakfast  polyethylene glycol 3350 17 Gram(s) Oral at bedtime  sacubitril 49 mG/valsartan 51 mG 1 Tablet(s) Oral two times a day  senna 2 Tablet(s) Oral at bedtime  tamsulosin 0.4 milliGRAM(s) Oral at bedtime    MEDICATIONS  (PRN):  albuterol/ipratropium for Nebulization 3 milliLiter(s) Nebulizer every 6 hours PRN Shortness of Breath and/or Wheezing  magnesium hydroxide Suspension 30 milliLiter(s) Oral daily PRN Constipation  oxyCODONE    IR 5 milliGRAM(s) Oral every 3 hours PRN Moderate Pain (4 - 6)  oxyCODONE    IR 10 milliGRAM(s) Oral every 3 hours PRN Severe Pain (7 - 10)  sodium chloride 0.65% Nasal 1 Spray(s) Both Nostrils three times a day PRN Nasal Congestion    Vital Signs Last 24 Hrs  T(F): 97.6 (02 Apr 2021 07:58), Max: 98.4 (02 Apr 2021 05:23)  HR: 60 (02 Apr 2021 07:58) (56 - 67)  BP: 133/78 (02 Apr 2021 07:58) (133/78 - 148/79)  RR: 17 (02 Apr 2021 07:58) (16 - 18)  SpO2: 92% (02 Apr 2021 07:58) (92% - 99%)  I&O's Summary    01 Apr 2021 07:01  -  02 Apr 2021 07:00  --------------------------------------------------------  IN: 740 mL / OUT: 700 mL / NET: 40 mL    02 Apr 2021 07:01  -  02 Apr 2021 15:10  --------------------------------------------------------  IN: 236 mL / OUT: 900 mL / NET: -664 mL      PHYSICAL EXAM:  General: morbidly obese, NAD, A/O x 3  ENT: Moist mucous membranes, no thrush  Neck: Supple, No JVD  Lungs: Clear to auscultation bilaterally, good air entry, non-labored breathing  Cardio: RRR, S1/S2, No murmur  Abdomen: Soft, Nontender, Nondistended; Bowel sounds present  Extremities: No calf tenderness, No pitting edema    LABS:                                                RADIOLOGY & ADDITIONAL TESTS:    Care Discussed with Consultants/Other Providers:    Patient is a 60y old  Male who presents with a chief complaint of bilateral quadriceps rupture (02 Apr 2021 13:17)      24 HOUR EVENTS:  No overnight events reported.     SUBJECTIVE:  Patient seen and examined at bedside. Feels well. Not wanting to go to rehab today.     ALLERGIES:  No Known Allergies    MEDICATIONS  (STANDING):  acetaminophen   Tablet .. 975 milliGRAM(s) Oral every 8 hours  aMIOdarone    Tablet 200 milliGRAM(s) Oral daily  apixaban 5 milliGRAM(s) Oral two times a day  atorvastatin 40 milliGRAM(s) Oral at bedtime  carvedilol 25 milliGRAM(s) Oral two times a day  finasteride 5 milliGRAM(s) Oral daily  influenza   Vaccine 0.5 milliLiter(s) IntraMuscular once  levothyroxine 100 MICROGram(s) Oral daily  melatonin 3 milliGRAM(s) Oral at bedtime  ondansetron Injectable 4 milliGRAM(s) IV Push once  oxybutynin 10 milliGRAM(s) Oral daily  pantoprazole    Tablet 40 milliGRAM(s) Oral before breakfast  polyethylene glycol 3350 17 Gram(s) Oral at bedtime  sacubitril 49 mG/valsartan 51 mG 1 Tablet(s) Oral two times a day  senna 2 Tablet(s) Oral at bedtime  tamsulosin 0.4 milliGRAM(s) Oral at bedtime    MEDICATIONS  (PRN):  albuterol/ipratropium for Nebulization 3 milliLiter(s) Nebulizer every 6 hours PRN Shortness of Breath and/or Wheezing  magnesium hydroxide Suspension 30 milliLiter(s) Oral daily PRN Constipation  oxyCODONE    IR 5 milliGRAM(s) Oral every 3 hours PRN Moderate Pain (4 - 6)  oxyCODONE    IR 10 milliGRAM(s) Oral every 3 hours PRN Severe Pain (7 - 10)  sodium chloride 0.65% Nasal 1 Spray(s) Both Nostrils three times a day PRN Nasal Congestion    Vital Signs Last 24 Hrs  T(F): 97.6 (02 Apr 2021 07:58), Max: 98.4 (02 Apr 2021 05:23)  HR: 60 (02 Apr 2021 07:58) (56 - 67)  BP: 133/78 (02 Apr 2021 07:58) (133/78 - 148/79)  RR: 17 (02 Apr 2021 07:58) (16 - 18)  SpO2: 92% (02 Apr 2021 07:58) (92% - 99%)  I&O's Summary    01 Apr 2021 07:01  -  02 Apr 2021 07:00  --------------------------------------------------------  IN: 740 mL / OUT: 700 mL / NET: 40 mL    02 Apr 2021 07:01  -  02 Apr 2021 15:10  --------------------------------------------------------  IN: 236 mL / OUT: 900 mL / NET: -664 mL      PHYSICAL EXAM:  GENERAL: NAD  HENT:  Atraumatic, Normocephalic; No tonsillar erythema, exudates, or enlargement; Moist mucous membranes;   EYES: EOMI, PERRLA, conjunctiva and sclera clear, no lid-lag  NECK: Supple, No JVD, Normal thyroid  NERVOUS SYSTEM:  CN II - XII intact; Sensation intact; Motor Strength 5/5 B/L upper and lower extremities  CHEST/LUNG: Clear to percussion bilaterally; No rales, rhonchi, wheezing, or rubs; normal respiratory effort, no intercostal retractions  HEART: Regular rate and rhythm; No murmurs, rubs, or gallops  ABDOMEN: Soft, Nontender, Nondistended; Bowel sounds present; No HSM  MUSCULOSKELETAL/EXTREMITIES:  2+ Peripheral Pulses, No clubbing, cyanosis, or peripheral edema; No digital cyanosis; Braces on both legs  SKIN: No rashes or lesions; normal texture and temperature  PSYCH: Appropriate affect, Alert & Oriented x 3    LABS:        RADIOLOGY & ADDITIONAL TESTS:    Care Discussed with Consultants/Other Providers:   Orthopaedics, Dr. Saenz.

## 2021-04-02 NOTE — DISCHARGE NOTE NURSING/CASE MANAGEMENT/SOCIAL WORK - PATIENT PORTAL LINK FT
You can access the FollowMyHealth Patient Portal offered by [facility] by registering at the following website: [email or web address]. By joining [employer]’s FollowMyHealth portal, you will also be able to view your health information using other applications (apps) compatible with our system.

## 2021-04-02 NOTE — PROGRESS NOTE ADULT - NSICDXPILOT_GEN_ALL_CORE
[place]

## 2021-04-02 NOTE — PROGRESS NOTE ADULT - SUBJECTIVE AND OBJECTIVE BOX
INTERVAL HPI/OVERNIGHT EVENTS:  60 Year old male, POD#11 s/p BL quad tendon rupture repair.  He has no complaints.        MEDICATIONS  (STANDING):  acetaminophen   Tablet .. 975 milliGRAM(s) Oral every 8 hours  aMIOdarone    Tablet 200 milliGRAM(s) Oral daily  apixaban 5 milliGRAM(s) Oral two times a day  atorvastatin 40 milliGRAM(s) Oral at bedtime  carvedilol 25 milliGRAM(s) Oral two times a day  finasteride 5 milliGRAM(s) Oral daily  influenza   Vaccine 0.5 milliLiter(s) IntraMuscular once  levothyroxine 100 MICROGram(s) Oral daily  melatonin 3 milliGRAM(s) Oral at bedtime  ondansetron Injectable 4 milliGRAM(s) IV Push once  oxybutynin 10 milliGRAM(s) Oral daily  pantoprazole    Tablet 40 milliGRAM(s) Oral before breakfast  polyethylene glycol 3350 17 Gram(s) Oral at bedtime  sacubitril 49 mG/valsartan 51 mG 1 Tablet(s) Oral two times a day  senna 2 Tablet(s) Oral at bedtime  tamsulosin 0.4 milliGRAM(s) Oral at bedtime    MEDICATIONS  (PRN):  albuterol/ipratropium for Nebulization 3 milliLiter(s) Nebulizer every 6 hours PRN Shortness of Breath and/or Wheezing  magnesium hydroxide Suspension 30 milliLiter(s) Oral daily PRN Constipation  oxyCODONE    IR 5 milliGRAM(s) Oral every 3 hours PRN Moderate Pain (4 - 6)  oxyCODONE    IR 10 milliGRAM(s) Oral every 3 hours PRN Severe Pain (7 - 10)  sodium chloride 0.65% Nasal 1 Spray(s) Both Nostrils three times a day PRN Nasal Congestion      Allergies    No Known Allergies      Vital Signs Last 24 Hrs  T(C): 36.4 (02 Apr 2021 07:58), Max: 36.9 (02 Apr 2021 05:23)  T(F): 97.6 (02 Apr 2021 07:58), Max: 98.4 (02 Apr 2021 05:23)  HR: 60 (02 Apr 2021 07:58) (56 - 67)  BP: 133/78 (02 Apr 2021 07:58) (133/78 - 148/79)  BP(mean): --  RR: 17 (02 Apr 2021 07:58) (16 - 18)  SpO2: 92% (02 Apr 2021 07:58) (92% - 99%)    General: Comfortable, NAD  Cardiovascular: S1S2 RRR  Respiratory: CTA  BL LE in [employer].  Ace wraps removed, steri strips intact, clean and dry.  No erythema, no discharge.  Thighs and calves soft, NT.  BL Dorsiflexion and Plantar flexion intact.

## 2021-04-08 PROBLEM — M25.461 EFFUSION, RIGHT KNEE: Chronic | Status: ACTIVE | Noted: 2021-03-22

## 2021-04-08 PROBLEM — E78.5 HYPERLIPIDEMIA, UNSPECIFIED: Chronic | Status: ACTIVE | Noted: 2021-03-22

## 2021-04-08 PROBLEM — R55 SYNCOPE AND COLLAPSE: Chronic | Status: ACTIVE | Noted: 2021-03-22

## 2021-04-08 PROBLEM — E66.01 MORBID (SEVERE) OBESITY DUE TO EXCESS CALORIES: Chronic | Status: ACTIVE | Noted: 2021-03-22

## 2021-04-08 PROBLEM — N40.0 BENIGN PROSTATIC HYPERPLASIA WITHOUT LOWER URINARY TRACT SYMPTOMS: Chronic | Status: ACTIVE | Noted: 2021-03-22

## 2021-04-08 PROBLEM — I10 ESSENTIAL (PRIMARY) HYPERTENSION: Chronic | Status: ACTIVE | Noted: 2021-03-22

## 2021-04-08 PROBLEM — I48.0 PAROXYSMAL ATRIAL FIBRILLATION: Chronic | Status: ACTIVE | Noted: 2021-03-22

## 2021-04-08 PROBLEM — I42.9 CARDIOMYOPATHY, UNSPECIFIED: Chronic | Status: ACTIVE | Noted: 2021-03-22

## 2021-04-08 PROBLEM — I50.32 CHRONIC DIASTOLIC (CONGESTIVE) HEART FAILURE: Chronic | Status: ACTIVE | Noted: 2021-03-22

## 2021-04-08 PROBLEM — I25.10 ATHEROSCLEROTIC HEART DISEASE OF NATIVE CORONARY ARTERY WITHOUT ANGINA PECTORIS: Chronic | Status: ACTIVE | Noted: 2021-03-22

## 2021-04-15 ENCOUNTER — APPOINTMENT (OUTPATIENT)
Dept: ORTHOPEDIC SURGERY | Facility: CLINIC | Age: 61
End: 2021-04-15
Payer: COMMERCIAL

## 2021-04-15 PROCEDURE — 99024 POSTOP FOLLOW-UP VISIT: CPT

## 2021-04-15 NOTE — HISTORY OF PRESENT ILLNESS
[Doing Well] : is doing well [Adequate Pain Control] : has adequate pain control [de-identified] : Right and left quadriceps tendon repairs on 03/22/2021 [de-identified] : 60 year male presents for a post operative evaluation Right and left quadriceps tendon repairs on 03/22/2021 Patient states he is feeling good post operatively. Patient presents in a bilateral [place] braces ,ambulating with a wheelchair.  Patient states he is feeling very good overall. He has been very complaint with wearing his [place] braces. Patient notes he has been performing physical therapy at the rehabilitation facility. Patient notes he has been practicing ambulation with the braces on, with a walker and with two aides for assistance. He notes minimal complaints with this. He states he is ready to go home from rehab at this time. \par Patient denies fever, chills, nausea or vomiting. Patient is here today for wound check  and clinical evaluation  [de-identified] : Right Lower Extremity\par o Knee :\par ¦ Inspection/Palpation : minimal quadriceps tendon and quadriceps muscle tenderness to palpation, no palpable defect noted in quadriceps tendon.  no swelling, no deformity, incisions clean, dry and intact, nonerythematous, no discharge or dehiscence. \par ¦ Range of Motion : not assessed today due to fracture \par ¦ Stability : no valgus or varus instability present on provocative testing, Lachman’s Test (-)\par ¦ Strength : Able to perform a SLR without lag. \par o Muscle Bulk : normal muscle bulk present\par o Skin : no erythema, no ecchymosis\par o Sensation : sensation to pin intact\par o Vascular Exam : no edema, no cyanosis, dorsalis pedis artery pulse 2+, posterior tibial artery pulse 2+\par \par Left Lower Extremity: \par o Knee :\par ¦ Inspection/Palpation : minimal quadriceps tendon and quadriceps muscle tenderness to palpation, no palpable defect noted in quadriceps tendon.  no swelling, no deformity, incisions clean, dry and intact, nonerythematous, no discharge or dehiscence. \par ¦ Range of Motion : not assessed today due to fracture \par ¦ Stability : no valgus or varus instability present on provocative testing, Lachman’s Test (-)\par ¦ Strength : Able to perform a SLR without lag. \par o Muscle Bulk : normal muscle bulk present\par o Skin : no erythema, no ecchymosis\par o Sensation : sensation to pin intact\par o Vascular Exam : no edema, no cyanosis, dorsalis pedis artery pulse 2+, posterior tibial artery pulse 2+ [de-identified] : Surgical procedure and post-operative xrays reviewed in great detail.\par \par Suture tails were removed and Steri-Strips were left in place. He was instructed to allow the Steri-Strips to fall off on their own.\par Paperwork for facility was completed.\par \par Patient may be WBAT with the [place] braces locked in extension. \par \par Activity modifications and restrictions were discussed.\par \par FU 2 weeks. \par \par All questions were answered, all alternatives discussed and the patient is in complete agreement with that plan. Follow-up appointment as instructed. Any issues and the patient will call or come in sooner.

## 2021-04-29 ENCOUNTER — APPOINTMENT (OUTPATIENT)
Dept: ORTHOPEDIC SURGERY | Facility: CLINIC | Age: 61
End: 2021-04-29
Payer: COMMERCIAL

## 2021-04-29 PROCEDURE — 99024 POSTOP FOLLOW-UP VISIT: CPT

## 2021-04-29 NOTE — HISTORY OF PRESENT ILLNESS
[Doing Well] : is doing well [Adequate Pain Control] : has adequate pain control [de-identified] : Right and left quadriceps tendon repairs on 03/22/2021 [de-identified] : 60 year male presents for a post operative evaluation Right and left quadriceps tendon repairs on 03/22/2021 Patient states he is feeling good post operatively. Patient presents in a bilateral [place] braces ,ambulating with a wheelchair.  Patient states he is feeling very good overall. He has been very complaint with wearing his [place] braces. Patient notes he has been performing physical therapy at the rehabilitation facility. Patient notes he has been practicing ambulation with the braces on, with a walker and with two aides for assistance. He notes minimal complaints with this. He states he is ready to go home from rehab at this time. \par Patient denies fever, chills, nausea or vomiting. Patient is here today for wound check  and clinical evaluation  [de-identified] : Right Lower Extremity\par o Knee :\par ¦ Inspection/Palpation : minimal quadriceps tendon and quadriceps muscle tenderness to palpation, no palpable defect noted in quadriceps tendon.  no swelling, no deformity, incisions clean, dry and intact with one vicry suture protruding at the superior pole of the wound, nonerythematous, no discharge or dehiscence. \par ¦ Range of Motion : 0-55 degrees\par ¦ Stability : no valgus or varus instability present on provocative testing, Lachman’s Test (-)\par ¦ Strength : Able to perform a SLR without lag. \par o Muscle Bulk : normal muscle bulk present\par o Skin : no erythema, no ecchymosis\par o Sensation : sensation to pin intact\par o Vascular Exam : no edema, no cyanosis, dorsalis pedis artery pulse 2+, posterior tibial artery pulse 2+\par \par Left Lower Extremity: \par o Knee :\par ¦ Inspection/Palpation : minimal quadriceps tendon and quadriceps muscle tenderness to palpation, no palpable defect noted in quadriceps tendon.  no swelling, no deformity, incisions clean, dry and intact, nonerythematous, no discharge or dehiscence. \par ¦ Range of Motion : 0-40 degrees\par ¦ Stability : no valgus or varus instability present on provocative testing, Lachman’s Test (-)\par ¦ Strength : Able to perform a SLR without lag. \par o Muscle Bulk : normal muscle bulk present\par o Skin : no erythema, no ecchymosis\par o Sensation : sensation to pin intact\par o Vascular Exam : no edema, no cyanosis, dorsalis pedis artery pulse 2+, posterior tibial artery pulse 2+ [de-identified] : continue PT as directed.  Follow up in 1 month\par [place] braces unlocked to 50 on the right and 40 on the left

## 2021-05-27 ENCOUNTER — APPOINTMENT (OUTPATIENT)
Dept: ORTHOPEDIC SURGERY | Facility: CLINIC | Age: 61
End: 2021-05-27
Payer: COMMERCIAL

## 2021-05-27 VITALS
DIASTOLIC BLOOD PRESSURE: 78 MMHG | BODY MASS INDEX: 37.22 KG/M2 | HEART RATE: 69 BPM | HEIGHT: 70 IN | WEIGHT: 260 LBS | SYSTOLIC BLOOD PRESSURE: 158 MMHG

## 2021-05-27 PROCEDURE — 99024 POSTOP FOLLOW-UP VISIT: CPT

## 2021-05-27 NOTE — HISTORY OF PRESENT ILLNESS
[Doing Well] : is doing well [Adequate Pain Control] : has adequate pain control [de-identified] : Right and left quadriceps tendon repairs on 03/22/2021 [de-identified] : 60 year male presents for a post operative evaluation Right and left quadriceps tendon repairs on 03/22/2021 Patient states he is feeling good post operatively. Patient presents in a bilateral [place] braces ,ambulating with a walker.  Patient states he is feeling very good overall. He has been very complaint with wearing his [place] braces. Patient notes he has been performing physical therapy at home starting on 05/27/2021.  He notes minimal complaints with this. \par Patient denies fever, chills, nausea or vomiting. Patient is here today for wound check  and clinical evaluation  [de-identified] : Right Lower Extremity\par o Knee :\par ¦ Inspection/Palpation : minimal quadriceps tendon and quadriceps muscle tenderness to palpation, no palpable defect noted in quadriceps tendon.  no swelling, no deformity, incision well healed. \par ¦ Range of Motion : 0-100 degrees\par ¦ Stability : no valgus or varus instability present on provocative testing, Lachman’s Test (-)\par ¦ Strength : Able to perform a SLR without lag. \par o Muscle Bulk : normal muscle bulk present\par o Skin : no erythema, no ecchymosis\par o Sensation : sensation to pin intact\par o Vascular Exam : no edema, no cyanosis, dorsalis pedis artery pulse 2+, posterior tibial artery pulse 2+\par \par Left Lower Extremity: \par o Knee :\par ¦ Inspection/Palpation : minimal quadriceps tendon and quadriceps muscle tenderness to palpation, no palpable defect noted in quadriceps tendon.  no swelling, no deformity, incisions clean, dry and intact, nonerythematous, no discharge or dehiscence. \par ¦ Range of Motion : 0-95 degrees\par ¦ Stability : no valgus or varus instability present on provocative testing, Lachman’s Test (-)\par ¦ Strength : Able to perform a SLR without lag. \par o Muscle Bulk : normal muscle bulk present\par o Skin : no erythema, no ecchymosis\par o Sensation : sensation to pin intact\par o Vascular Exam : no edema, no cyanosis, dorsalis pedis artery pulse 2+, posterior tibial artery pulse 2+ [de-identified] : The underlying pathophysiology was reviewed in great detail. \par \par Continue physical therapy as prescribed. A prescription for Physical Therapy was provided.\par \par Discussed slowly discontinuing use of assistive ambulation device, unlocking braces and discontinue braces over the next 6 weeks. \par \par Activity modifications and restrictions were discussed.\par \par Paperwork for a handicap pass was completed today. \par FU 6 weeks. \par \par All questions were answered, all alternatives discussed and the patient is in complete agreement with that plan. Follow-up appointment as instructed. Any issues and the patient will call or come in sooner.

## 2021-07-08 ENCOUNTER — APPOINTMENT (OUTPATIENT)
Dept: ORTHOPEDIC SURGERY | Facility: CLINIC | Age: 61
End: 2021-07-08

## 2021-07-09 ENCOUNTER — APPOINTMENT (OUTPATIENT)
Dept: ORTHOPEDIC SURGERY | Facility: CLINIC | Age: 61
End: 2021-07-09
Payer: COMMERCIAL

## 2021-07-09 PROCEDURE — 99213 OFFICE O/P EST LOW 20 MIN: CPT

## 2021-07-09 NOTE — PHYSICAL EXAM
[de-identified] : Right Lower Extremity\par o Knee :\par ¦ Inspection/Palpation : minimal quadriceps tendon and quadriceps muscle tenderness to palpation, no palpable defect noted in quadriceps tendon. no swelling, no deformity, incision well healed. \par ¦ Range of Motion : 0-110 degrees\par ¦ Stability : no valgus or varus instability present on provocative testing, Lachman’s Test (-)\par ¦ Strength : quad 5/5, gluteus medius 3/5 \par o Muscle Bulk : normal muscle bulk present\par o Skin : no erythema, no ecchymosis\par o Sensation : sensation to pin intact\par o Vascular Exam : no edema, no cyanosis, dorsalis pedis artery pulse 2+, posterior tibial artery pulse 2+\par \par Left Lower Extremity: \par o Knee :\par ¦ Inspection/Palpation : minimal quadriceps tendon and quadriceps muscle tenderness to palpation, no palpable defect noted in quadriceps tendon. no swelling, no deformity, incisions clean, dry and intact, nonerythematous, no discharge or dehiscence. \par ¦ Range of Motion : 0-105 degrees\par ¦ Stability : no valgus or varus instability present on provocative testing, Lachman’s Test (-)\par ¦ Strength : quad 5/5, gluteus medius 3/5 \par o Muscle Bulk : normal muscle bulk present\par o Skin : no erythema, no ecchymosis\par o Sensation : sensation to pin intact\par o Vascular Exam : no edema, no cyanosis, dorsalis pedis artery pulse 2+, posterior tibial artery pulse 2+. \par

## 2021-07-09 NOTE — DISCUSSION/SUMMARY
[de-identified] : The underlying pathophysiology was reviewed in great detail. \par \par Continue physical therapy as prescribed. A prescription for Physical Therapy was provided.\par \par Discussed slowly discontinuing use of assistive ambulation devices. \par \par Activity modifications and restrictions were discussed.\par \par FU 6 weeks. \par \par All questions were answered, all alternatives discussed and the patient is in complete agreement with that plan. Follow-up appointment as instructed. Any issues and the patient will call or come in sooner.

## 2021-07-09 NOTE — HISTORY OF PRESENT ILLNESS
[de-identified] : 60 year male presents for a post operative evaluation Right and left quadriceps tendon repairs on 03/22/2021 Patient states he is feeling good post operatively. Patient presents ambulating with crutches. Patient states he is feeling very good overall. He has discontinued his [place] braces at this time. He  has been attending physical therapy noting improvements in strength and range of motion. He notes progressively improving range of motion. Patient denies any other complaints at this time.

## 2021-09-28 ENCOUNTER — APPOINTMENT (OUTPATIENT)
Dept: ORTHOPEDIC SURGERY | Facility: CLINIC | Age: 61
End: 2021-09-28
Payer: COMMERCIAL

## 2021-09-28 DIAGNOSIS — R29.898 OTHER SYMPTOMS AND SIGNS INVOLVING THE MUSCULOSKELETAL SYSTEM: ICD-10-CM

## 2021-09-28 PROCEDURE — 99213 OFFICE O/P EST LOW 20 MIN: CPT

## 2021-09-28 NOTE — DISCUSSION/SUMMARY
[de-identified] : The underlying pathophysiology was reviewed in great detail. \par \par Continue physical therapy as prescribed. A prescription for Physical Therapy was provided.\par \par A home exercise sheet was given and discussed with the patient to follow. \par \par Activity modifications and restrictions were discussed.\par \par FU 6 weeks. \par \par All questions were answered, all alternatives discussed and the patient is in complete agreement with that plan. Follow-up appointment as instructed. Any issues and the patient will call or come in sooner.

## 2021-09-28 NOTE — PHYSICAL EXAM
[de-identified] : Right Lower Extremity\par o Knee :\par ¦ Inspection/Palpation : minimal quadriceps tendon and quadriceps muscle tenderness to palpation, no palpable defect noted in quadriceps tendon. no swelling, no deformity, incision well healed. \par ¦ Range of Motion : 0-110 degrees\par ¦ Stability : no valgus or varus instability present on provocative testing, Lachman’s Test (-)\par ¦ Strength : quad 5/5, gluteus medius 3+/5, adduction 3/5\par o Muscle Bulk : normal muscle bulk present\par o Skin : no erythema, no ecchymosis\par o Sensation : sensation to pin intact\par o Vascular Exam : no edema, no cyanosis, dorsalis pedis artery pulse 2+, posterior tibial artery pulse 2+\par \par Left Lower Extremity: \par o Knee :\par ¦ Inspection/Palpation : minimal quadriceps tendon and quadriceps muscle tenderness to palpation, no palpable defect noted in quadriceps tendon. no swelling, no deformity, incisions clean, dry and intact, nonerythematous, no discharge or dehiscence. \par ¦ Range of Motion : 0-105 degrees\par ¦ Stability : no valgus or varus instability present on provocative testing, Lachman’s Test (-)\par ¦ Strength : quad 5/5, gluteus medius 3+/5, adduction 3/5\par o Muscle Bulk : normal muscle bulk present\par o Skin : no erythema, no ecchymosis\par o Sensation : sensation to pin intact\par o Vascular Exam : no edema, no cyanosis, dorsalis pedis artery pulse 2+, posterior tibial artery pulse 2+. \par

## 2021-09-28 NOTE — HISTORY OF PRESENT ILLNESS
[de-identified] : 60 year male presents for a post operative evaluation Right and left quadriceps tendon repairs on 03/22/2021 Patient states he is feeling good post operatively. Patient presents ambulating with crutches. Patient states he is feeling very good overall. He has discontinued his [place] braces at this time. He  has been attending physical therapy noting improvements in strength and range of motion. He notes progressively improving range of motion. Patient denies any other complaints at this time.

## 2021-11-01 ENCOUNTER — OUTPATIENT (OUTPATIENT)
Dept: OUTPATIENT SERVICES | Facility: HOSPITAL | Age: 61
LOS: 1 days | End: 2021-11-01
Payer: COMMERCIAL

## 2021-11-01 VITALS
WEIGHT: 281.97 LBS | SYSTOLIC BLOOD PRESSURE: 148 MMHG | TEMPERATURE: 98 F | HEART RATE: 68 BPM | HEIGHT: 69 IN | RESPIRATION RATE: 18 BRPM | DIASTOLIC BLOOD PRESSURE: 84 MMHG | OXYGEN SATURATION: 96 %

## 2021-11-01 DIAGNOSIS — Z95.810 PRESENCE OF AUTOMATIC (IMPLANTABLE) CARDIAC DEFIBRILLATOR: ICD-10-CM

## 2021-11-01 DIAGNOSIS — Z95.810 PRESENCE OF AUTOMATIC (IMPLANTABLE) CARDIAC DEFIBRILLATOR: Chronic | ICD-10-CM

## 2021-11-01 DIAGNOSIS — Z90.49 ACQUIRED ABSENCE OF OTHER SPECIFIED PARTS OF DIGESTIVE TRACT: Chronic | ICD-10-CM

## 2021-11-01 DIAGNOSIS — S76.109A UNSPECIFIED INJURY OF UNSPECIFIED QUADRICEPS MUSCLE, FASCIA AND TENDON, INITIAL ENCOUNTER: Chronic | ICD-10-CM

## 2021-11-01 DIAGNOSIS — Z98.890 OTHER SPECIFIED POSTPROCEDURAL STATES: Chronic | ICD-10-CM

## 2021-11-01 DIAGNOSIS — Z12.11 ENCOUNTER FOR SCREENING FOR MALIGNANT NEOPLASM OF COLON: ICD-10-CM

## 2021-11-01 DIAGNOSIS — G47.30 SLEEP APNEA, UNSPECIFIED: ICD-10-CM

## 2021-11-01 DIAGNOSIS — R94.31 ABNORMAL ELECTROCARDIOGRAM [ECG] [EKG]: ICD-10-CM

## 2021-11-01 DIAGNOSIS — Z95.5 PRESENCE OF CORONARY ANGIOPLASTY IMPLANT AND GRAFT: Chronic | ICD-10-CM

## 2021-11-01 DIAGNOSIS — I25.10 ATHEROSCLEROTIC HEART DISEASE OF NATIVE CORONARY ARTERY WITHOUT ANGINA PECTORIS: ICD-10-CM

## 2021-11-01 DIAGNOSIS — Z86.718 PERSONAL HISTORY OF OTHER VENOUS THROMBOSIS AND EMBOLISM: ICD-10-CM

## 2021-11-01 DIAGNOSIS — R19.5 OTHER FECAL ABNORMALITIES: ICD-10-CM

## 2021-11-01 DIAGNOSIS — I82.409 ACUTE EMBOLISM AND THROMBOSIS OF UNSPECIFIED DEEP VEINS OF UNSPECIFIED LOWER EXTREMITY: Chronic | ICD-10-CM

## 2021-11-01 LAB
ANION GAP SERPL CALC-SCNC: 12 MMOL/L — SIGNIFICANT CHANGE UP (ref 7–14)
BUN SERPL-MCNC: 13 MG/DL — SIGNIFICANT CHANGE UP (ref 7–23)
CALCIUM SERPL-MCNC: 9.3 MG/DL — SIGNIFICANT CHANGE UP (ref 8.4–10.5)
CHLORIDE SERPL-SCNC: 104 MMOL/L — SIGNIFICANT CHANGE UP (ref 98–107)
CO2 SERPL-SCNC: 26 MMOL/L — SIGNIFICANT CHANGE UP (ref 22–31)
CREAT SERPL-MCNC: 0.97 MG/DL — SIGNIFICANT CHANGE UP (ref 0.5–1.3)
GLUCOSE SERPL-MCNC: 167 MG/DL — HIGH (ref 70–99)
HCT VFR BLD CALC: 44.8 % — SIGNIFICANT CHANGE UP (ref 39–50)
HGB BLD-MCNC: 15.3 G/DL — SIGNIFICANT CHANGE UP (ref 13–17)
MCHC RBC-ENTMCNC: 31.8 PG — SIGNIFICANT CHANGE UP (ref 27–34)
MCHC RBC-ENTMCNC: 34.2 GM/DL — SIGNIFICANT CHANGE UP (ref 32–36)
MCV RBC AUTO: 93.1 FL — SIGNIFICANT CHANGE UP (ref 80–100)
NRBC # BLD: 0 /100 WBCS — SIGNIFICANT CHANGE UP
NRBC # FLD: 0 K/UL — SIGNIFICANT CHANGE UP
PLATELET # BLD AUTO: 213 K/UL — SIGNIFICANT CHANGE UP (ref 150–400)
POTASSIUM SERPL-MCNC: 4 MMOL/L — SIGNIFICANT CHANGE UP (ref 3.5–5.3)
POTASSIUM SERPL-SCNC: 4 MMOL/L — SIGNIFICANT CHANGE UP (ref 3.5–5.3)
RBC # BLD: 4.81 M/UL — SIGNIFICANT CHANGE UP (ref 4.2–5.8)
RBC # FLD: 13.1 % — SIGNIFICANT CHANGE UP (ref 10.3–14.5)
SODIUM SERPL-SCNC: 142 MMOL/L — SIGNIFICANT CHANGE UP (ref 135–145)
WBC # BLD: 7.52 K/UL — SIGNIFICANT CHANGE UP (ref 3.8–10.5)
WBC # FLD AUTO: 7.52 K/UL — SIGNIFICANT CHANGE UP (ref 3.8–10.5)

## 2021-11-01 PROCEDURE — 93010 ELECTROCARDIOGRAM REPORT: CPT

## 2021-11-01 RX ORDER — SACUBITRIL AND VALSARTAN 24; 26 MG/1; MG/1
1 TABLET, FILM COATED ORAL
Qty: 0 | Refills: 0 | DISCHARGE

## 2021-11-01 RX ORDER — AMIODARONE HYDROCHLORIDE 400 MG/1
1 TABLET ORAL
Qty: 0 | Refills: 0 | DISCHARGE

## 2021-11-01 RX ORDER — APIXABAN 2.5 MG/1
1 TABLET, FILM COATED ORAL
Qty: 0 | Refills: 0 | DISCHARGE

## 2021-11-01 RX ORDER — CARVEDILOL PHOSPHATE 80 MG/1
1 CAPSULE, EXTENDED RELEASE ORAL
Qty: 0 | Refills: 0 | DISCHARGE

## 2021-11-01 RX ORDER — ATORVASTATIN CALCIUM 80 MG/1
1 TABLET, FILM COATED ORAL
Qty: 0 | Refills: 0 | DISCHARGE

## 2021-11-01 RX ORDER — FINASTERIDE 5 MG/1
1 TABLET, FILM COATED ORAL
Qty: 0 | Refills: 0 | DISCHARGE

## 2021-11-01 RX ORDER — LANOLIN ALCOHOL/MO/W.PET/CERES
1 CREAM (GRAM) TOPICAL
Qty: 0 | Refills: 0 | DISCHARGE

## 2021-11-01 RX ORDER — TAMSULOSIN HYDROCHLORIDE 0.4 MG/1
1 CAPSULE ORAL
Qty: 0 | Refills: 0 | DISCHARGE

## 2021-11-01 RX ORDER — OXYBUTYNIN CHLORIDE 5 MG
1 TABLET ORAL
Qty: 0 | Refills: 0 | DISCHARGE

## 2021-11-01 RX ORDER — ASPIRIN/CALCIUM CARB/MAGNESIUM 324 MG
1 TABLET ORAL
Qty: 0 | Refills: 0 | DISCHARGE

## 2021-11-01 NOTE — H&P PST ADULT - CARDIOVASCULAR COMMENTS
Hx CHAfib CAD, stents x6,  most recently 12 years ago;   AICD impalnteed 2011; Loop recorder implanted 2019 AICD, Lop recorder to left upper chest palpated Hx CHF Afib CAD, stents x6,  most recently 12 years ago;   AICD implanted 2011; Loop recorder implanted 2019 AICD, Loop recorder to left upper chest palpated

## 2021-11-01 NOTE — H&P PST ADULT - ASSESSMENT
59 y/o male with hx of  CAD, coronary artery stents x6, most recently 12 years ago; defibrillator implanted 2011.  Also with Afib,  and loop recorder implanted 5/7/19. Pt reports he did an at home colon screen test, and blood was found in stool.  Now  Scheduled for coloscopy 59 y/o male with hx of  CAD, coronary artery stents x6, most recent stent placed 2009;  defibrillator implanted 2011.  Also with hx of Afib,   loop recorder implanted 5/7/19. Pt reports he did an at home colon screen test, blood was found in stool.  Now  Scheduled for coloscopy

## 2021-11-01 NOTE — H&P PST ADULT - PROBLEM SELECTOR PLAN 4
Pt reports last dose Eliquis 3 days prior to surgery as per Cardiologist Pt reports last dose Eliquis 3 days prior to surgery as per Cardiologist recommendation.

## 2021-11-01 NOTE — H&P PST ADULT - PROBLEM SELECTOR PLAN 3
stented coronary artery    Continues on low dose ASA    Cardiology eval requested on chart stented coronary artery    Continues on low dose ASA    Pt reports Cardiologist will provide pre-op eval, requested on chart

## 2021-11-01 NOTE — H&P PST ADULT - PROBLEM SELECTOR PLAN 2
OR booking informed Model and serial # documented and OR booking informed.      Pt advised to take his morning meds with sip of water morning of surgery (Carvedilol, amiodarone, Entresto, Levothyroxine)

## 2021-11-01 NOTE — H&P PST ADULT - HISTORY OF PRESENT ILLNESS
59 y/o male with hx of CAD, coronary artery stents x6, most recently 12 years ago; defibrillator implanted 2011.  Also with Afib,  and loop recorder implanted 5/7/19. Scheduled for routine colonscopy 59 y/o male with hx of  CAD, coronary artery stents x6, most recently 12 years ago; defibrillator implanted 2011.  Also with Afib,  and loop recorder implanted 5/7/19. Pt reports he did an at home colon screen test, and blood was found in stool.  Now  Scheduled for coloscopy

## 2021-11-01 NOTE — H&P PST ADULT - NS MD HP INPLANTS MED DEV
coronary artery  stents x6, AICD [employer] CD 1231-40Q; Serial [id number] Implated 1/4/2011; , Loop recorder LNQ11 [id number]

## 2021-11-01 NOTE — H&P PST ADULT - NEGATIVE GENERAL GENITOURINARY SYMPTOMS
Ultrasound (12/22/19)  **Prelim**  - heterogeneous testicle  - no blood flow identified  - normal right testicle exam EXAM:  US SCROTUM AND CONTENTS        PROCEDURE DATE:  Dec 22 2019         INTERPRETATION:  CLINICAL INFORMATION: Left testicular pain. Concern for torsion.    COMPARISON: None available.    TECHNIQUE: Testicular ultrasound utilizing color and spectral Doppler.    FINDINGS:    RIGHT:    Right testis: 4.0 x 2.0 x 2.4 cm. Normal echogenicity and echotexture with no masses or areas of architectural distortion. Normal arterial and venous blood flow pattern.    Right epididymis: Within normal limits.    Right hydrocele: None.    Right varicocele: None.      LEFT:     Left testis: 4.0 x 2.9 x 3.1 cm. Firm with heterogeneous echotexture and septated fluid collection superior to the left testicle. No flow is noted within the testicle.    Left epididymis: Within normal limits.    Left hydrocele: None.    Left varicocele: None.    IMPRESSION:     Left testicular torsion.    Dr. Steve discussed these findings with Dr. Falk on 12/22/2019 4:32 PM with read back.              JENNI STEVE M.D., RADIOLOGIST RESIDENT  This document has been electronically signed.  ERICK MARROQUIN M.D., ATTENDING RADIOLOGIST  This document has been electronically signed. Dec 22 2019  4:42PM no hematuria

## 2021-11-01 NOTE — H&P PST ADULT - NSICDXPASTMEDICALHX_GEN_ALL_CORE_FT
PAST MEDICAL HISTORY:  BPH (benign prostatic hyperplasia)     CAD (coronary artery disease)     Cardiomyopathy     Chronic diastolic congestive heart failure     Effusion of both knee joints     HLD (hyperlipidemia)     HTN (hypertension)     Morbid obesity     ERICK (obstructive sleep apnea)     Paroxysmal A-fib     Syncope      PAST MEDICAL HISTORY:  BPH (benign prostatic hyperplasia)     CAD (coronary artery disease)     Cardiomyopathy     Chronic diastolic congestive heart failure     Effusion of both knee joints     History of BPH     HLD (hyperlipidemia)     HTN (hypertension)     Morbid obesity     ERICK (obstructive sleep apnea)     Paroxysmal A-fib     Syncope      PAST MEDICAL HISTORY:  BPH (benign prostatic hyperplasia)     CAD (coronary artery disease)     Cardiomyopathy     Chronic diastolic congestive heart failure     Effusion of both knee joints     H/O: hypothyroidism     History of BPH     HLD (hyperlipidemia)     HTN (hypertension)     Morbid obesity     ERICK (obstructive sleep apnea)     Paroxysmal A-fib     Syncope

## 2021-11-01 NOTE — H&P PST ADULT - GASTROINTESTINAL COMMENTS
Pt reports he did a home colon cancer screening test  which showed blood in stool.  scheduled for colonoscopy

## 2021-11-01 NOTE — H&P PST ADULT - HEMATOLOGY/LYMPHATICS COMMENTS
Pt reports hx of DVT 2011, on Eliquis.  per pt, he was instructed to stop Eliquis 3 days prior to colonoscopy

## 2021-11-01 NOTE — H&P PST ADULT - NSICDXPASTSURGICALHX_GEN_ALL_CORE_FT
PAST SURGICAL HISTORY:  Acute DVT (deep venous thrombosis) 2011    Biventricular ICD (implantable cardioverter-defibrillator) in place     Coronary stent patent     History of appendectomy childhood    History of loop recorder Model LNQ11; [id number] implanted 5/7/19     PAST SURGICAL HISTORY:  Acute DVT (deep venous thrombosis) 2011    Biventricular ICD (implantable cardioverter-defibrillator) in place     Coronary stent patent     History of appendectomy childhood    History of loop recorder Model LNQ11; [id number] implanted 5/7/19    Injury of quadriceps tendon repair bilateral quadriceps 3/22/2    Stented coronary artery x6, most recent placed 2009     PAST SURGICAL HISTORY:  Acute DVT (deep venous thrombosis) 2011    Biventricular ICD (implantable cardioverter-defibrillator) in place 2011    Coronary stent patent x6, most recent stent 2009    History of appendectomy childhood    History of loop recorder Model LNQ11; [id number] implanted 5/7/19    Injury of quadriceps tendon repair bilateral quadriceps 3/22/21    Stented coronary artery x6, most recent placed 2009

## 2021-11-01 NOTE — H&P PST ADULT - NSICDXFAMILYHX_GEN_ALL_CORE_FT
FAMILY HISTORY:  Mother  Still living? No  FH: myocardial infarction, Age at diagnosis: Age Unknown

## 2021-11-11 ENCOUNTER — OUTPATIENT (OUTPATIENT)
Dept: OUTPATIENT SERVICES | Facility: HOSPITAL | Age: 61
LOS: 1 days | Discharge: HOME | End: 2021-11-11
Payer: COMMERCIAL

## 2021-11-11 ENCOUNTER — RESULT REVIEW (OUTPATIENT)
Age: 61
End: 2021-11-11

## 2021-11-11 VITALS
DIASTOLIC BLOOD PRESSURE: 83 MMHG | WEIGHT: 278 LBS | RESPIRATION RATE: 18 BRPM | HEART RATE: 76 BPM | OXYGEN SATURATION: 92 % | SYSTOLIC BLOOD PRESSURE: 140 MMHG | TEMPERATURE: 98 F | HEIGHT: 70 IN

## 2021-11-11 VITALS
DIASTOLIC BLOOD PRESSURE: 66 MMHG | SYSTOLIC BLOOD PRESSURE: 120 MMHG | RESPIRATION RATE: 16 BRPM | OXYGEN SATURATION: 94 % | HEART RATE: 68 BPM

## 2021-11-11 DIAGNOSIS — S76.109A UNSPECIFIED INJURY OF UNSPECIFIED QUADRICEPS MUSCLE, FASCIA AND TENDON, INITIAL ENCOUNTER: Chronic | ICD-10-CM

## 2021-11-11 DIAGNOSIS — I82.409 ACUTE EMBOLISM AND THROMBOSIS OF UNSPECIFIED DEEP VEINS OF UNSPECIFIED LOWER EXTREMITY: Chronic | ICD-10-CM

## 2021-11-11 DIAGNOSIS — R19.5 OTHER FECAL ABNORMALITIES: ICD-10-CM

## 2021-11-11 DIAGNOSIS — Z90.49 ACQUIRED ABSENCE OF OTHER SPECIFIED PARTS OF DIGESTIVE TRACT: Chronic | ICD-10-CM

## 2021-11-11 DIAGNOSIS — Z98.890 OTHER SPECIFIED POSTPROCEDURAL STATES: Chronic | ICD-10-CM

## 2021-11-11 DIAGNOSIS — Z95.810 PRESENCE OF AUTOMATIC (IMPLANTABLE) CARDIAC DEFIBRILLATOR: Chronic | ICD-10-CM

## 2021-11-11 DIAGNOSIS — Z95.5 PRESENCE OF CORONARY ANGIOPLASTY IMPLANT AND GRAFT: Chronic | ICD-10-CM

## 2021-11-11 PROCEDURE — 88305 TISSUE EXAM BY PATHOLOGIST: CPT | Mod: 26

## 2021-11-11 NOTE — ASU PATIENT PROFILE, ADULT - NSICDXPASTMEDICALHX_GEN_ALL_CORE_FT
PAST MEDICAL HISTORY:  BPH (benign prostatic hyperplasia)     CAD (coronary artery disease)     Cardiomyopathy     Chronic diastolic congestive heart failure     Effusion of both knee joints     H/O: hypothyroidism     History of BPH     HLD (hyperlipidemia)     HTN (hypertension)     Morbid obesity     ERICK (obstructive sleep apnea)     Paroxysmal A-fib     Syncope

## 2021-11-11 NOTE — ASU PATIENT PROFILE, ADULT - NSICDXPASTSURGICALHX_GEN_ALL_CORE_FT
PAST SURGICAL HISTORY:  Acute DVT (deep venous thrombosis) 2011    Biventricular ICD (implantable cardioverter-defibrillator) in place 2011    Coronary stent patent x6, most recent stent 2009    History of appendectomy childhood    History of loop recorder Model LNQ11; [id number] implanted 5/7/19    Injury of quadriceps tendon repair bilateral quadriceps 3/22/21    Stented coronary artery x6, most recent placed 2009

## 2021-11-16 LAB — SURGICAL PATHOLOGY STUDY: SIGNIFICANT CHANGE UP

## 2022-03-24 PROBLEM — Z86.39 PERSONAL HISTORY OF OTHER ENDOCRINE, NUTRITIONAL AND METABOLIC DISEASE: Chronic | Status: ACTIVE | Noted: 2021-11-01

## 2022-03-24 PROBLEM — Z87.438 PERSONAL HISTORY OF OTHER DISEASES OF MALE GENITAL ORGANS: Chronic | Status: ACTIVE | Noted: 2021-11-01

## 2022-03-29 ENCOUNTER — APPOINTMENT (OUTPATIENT)
Dept: ORTHOPEDIC SURGERY | Facility: CLINIC | Age: 62
End: 2022-03-29
Payer: COMMERCIAL

## 2022-03-29 DIAGNOSIS — S76.112D STRAIN OF LEFT QUADRICEPS MUSCLE, FASCIA AND TENDON, SUBSEQUENT ENCOUNTER: ICD-10-CM

## 2022-03-29 DIAGNOSIS — S76.111D STRAIN OF RIGHT QUADRICEPS MUSCLE, FASCIA AND TENDON, SUBSEQUENT ENCOUNTER: ICD-10-CM

## 2022-03-29 DIAGNOSIS — M17.0 BILATERAL PRIMARY OSTEOARTHRITIS OF KNEE: ICD-10-CM

## 2022-03-29 PROCEDURE — 20610 DRAIN/INJ JOINT/BURSA W/O US: CPT | Mod: LT

## 2022-03-29 PROCEDURE — 99214 OFFICE O/P EST MOD 30 MIN: CPT | Mod: 25

## 2022-03-30 PROBLEM — S76.111D RUPTURE OF RIGHT QUADRICEPS TENDON, SUBSEQUENT ENCOUNTER: Status: ACTIVE | Noted: 2021-03-18

## 2022-03-30 PROBLEM — M17.0 PRIMARY OSTEOARTHRITIS OF BOTH KNEES: Status: ACTIVE | Noted: 2022-03-30

## 2022-03-30 PROBLEM — S76.112D RUPTURE OF LEFT QUADRICEPS TENDON, SUBSEQUENT ENCOUNTER: Status: ACTIVE | Noted: 2021-03-18

## 2022-03-30 NOTE — PHYSICAL EXAM
[Normal RLE] : Right Lower Extremity: No scars, rashes, lesions, ulcers, skin intact [Normal LLE] : Left Lower Extremity: No scars, rashes, lesions, ulcers, skin intact [Normal Touch] : sensation intact for touch [Normal] : Alert and in no acute distress [Obese] : obese [Poor Appearance] : well-appearing [Acute Distress] : not in acute distress [de-identified] : Right Lower Extremity\par o Knee :\par ¦ Inspection/Palpation : minimal quadriceps tendon and quadriceps muscle tenderness to palpation, no palpable defect noted in quadriceps tendon. no swelling, no deformity, incision well healed. \par ¦ Range of Motion : 0-115 degrees\par ¦ Stability : no valgus or varus instability present on provocative testing, Lachman’s Test (-)\par ¦ Strength : quad 5/5\par o Muscle Bulk : normal muscle bulk present\par o Skin : no erythema, no ecchymosis\par o Sensation : sensation to pin intact\par o Vascular Exam : no edema, no cyanosis, dorsalis pedis artery pulse 2+, posterior tibial artery pulse 2+\par \par Left Lower Extremity: \par o Knee :\par ¦ Inspection/Palpation : minimal quadriceps tendon and quadriceps muscle tenderness to palpation, no palpable defect noted in quadriceps tendon. 1- 2+ effusion, no deformity, incisions clean, dry and intact, nonerythematous, no discharge or dehiscence. \par ¦ Range of Motion : 0-105 degrees\par ¦ Stability : no valgus or varus instability present on provocative testing, Lachman’s Test (-)\par ¦ Strength : quad 5/5\par o Muscle Bulk : normal muscle bulk present\par o Skin : no erythema, no ecchymosis\par o Sensation : sensation to pin intact\par o Vascular Exam : no edema, no cyanosis, dorsalis pedis artery pulse 2+, posterior tibial artery pulse 2+.

## 2022-03-30 NOTE — PROCEDURE
[de-identified] : At this point I recommended aspiration and therapeutic injection and under sterile precautions an injection of 2 cc 1% lidocaine with 0.5 cc of Kenalog and 0.5 cc of Dexamethasone- was placed into the joint of the left knee without complication after 20 cc of yellow tinged synovial fluid was aspirated and after several minutes the patient felt significant relief.

## 2022-03-30 NOTE — END OF VISIT
[FreeTextEntry3] : All medical record entries made by the Scribe were at my, Dr. Ej Caro, direction and personally dictated by me on 03/29/2022. I have reviewed the chart and agree that the record accurately reflects my personal performance of the history, physical exam, assessment and plan. I have also personally directed, reviewed, and agreed with the chart.

## 2022-03-30 NOTE — HISTORY OF PRESENT ILLNESS
[de-identified] : 60 year male presents for follow up evaluation s/p Right and left quadriceps tendon repairs on 03/22/2021. Patient states he is feeling good post operatively until about 2 weeks ago when he developed some left knee swelling. He has been attending physical therapy noting improvements in strength and range of motion. He notes progressively improving range of motion. Patient denies any other complaints at this time.

## 2022-03-30 NOTE — ADDENDUM
[FreeTextEntry1] : I, Prince Huang, acted solely as a scribe for Dr. Ej Caro on this date 03/29/2022.

## 2022-03-30 NOTE — DISCUSSION/SUMMARY
[de-identified] : The underlying pathophysiology was reviewed in great detail with the patient as well as the various treatment options, including ice, analgesics, NSAIDs, Physical therapy, steroid injections.\par \par The right knee was aspirated and a right knee cortisone injection was provided today in office for symptomatic relief. Patient tolerated procedure well. Advised to use ice prn. \par \par Activity modifications and restrictions were discussed. I advised avoiding deep bending, squatting and high intensity activity.\par \par A home exercise sheet was given and discussed with the patient to follow. \par \par I have recommended utilizing a knee sleeve to provide added support and stability. \par \par All questions were answered, all alternatives discussed and the patient is in complete agreement with that plan. Follow-up appointment as instructed. Any issues and the patient will call or come in sooner.

## 2022-06-24 ENCOUNTER — NON-APPOINTMENT (OUTPATIENT)
Age: 62
End: 2022-06-24

## 2022-10-21 RX ORDER — ATORVASTATIN CALCIUM 40 MG/1
40 TABLET, FILM COATED ORAL
Refills: 0 | Status: ACTIVE | COMMUNITY

## 2022-10-21 RX ORDER — SODIUM CHLORIDE 0.65 %
0.65 AEROSOL, SPRAY (ML) NASAL
Refills: 0 | Status: DISCONTINUED | COMMUNITY
End: 2022-10-21

## 2022-10-21 RX ORDER — METFORMIN HYDROCHLORIDE 500 MG/1
500 TABLET, COATED ORAL
Qty: 180 | Refills: 3 | Status: ACTIVE | COMMUNITY

## 2022-10-21 RX ORDER — ACETAMINOPHEN 325 MG/1
TABLET, FILM COATED ORAL
Refills: 0 | Status: DISCONTINUED | COMMUNITY
End: 2022-10-21

## 2022-10-21 RX ORDER — FINASTERIDE 5 MG/1
5 TABLET, FILM COATED ORAL
Refills: 0 | Status: DISCONTINUED | COMMUNITY
End: 2022-10-21

## 2022-10-21 RX ORDER — OXYBUTYNIN CHLORIDE 2.5 MG/1
TABLET ORAL
Refills: 0 | Status: DISCONTINUED | COMMUNITY
End: 2022-10-21

## 2022-10-21 RX ORDER — SACUBITRIL AND VALSARTAN 49; 51 MG/1; MG/1
49-51 TABLET, FILM COATED ORAL TWICE DAILY
Refills: 0 | Status: ACTIVE | COMMUNITY

## 2022-10-21 RX ORDER — LEVOTHYROXINE SODIUM 0.05 MG/1
50 TABLET ORAL
Refills: 0 | Status: ACTIVE | COMMUNITY

## 2022-10-21 RX ORDER — CARVEDILOL 25 MG/1
25 TABLET, FILM COATED ORAL TWICE DAILY
Refills: 0 | Status: ACTIVE | COMMUNITY

## 2022-10-21 RX ORDER — TRAMADOL HYDROCHLORIDE 25 MG/1
TABLET, COATED ORAL
Refills: 0 | Status: DISCONTINUED | COMMUNITY
End: 2022-10-21

## 2022-10-21 RX ORDER — APIXABAN 5 MG/1
5 TABLET, FILM COATED ORAL
Qty: 60 | Refills: 5 | Status: ACTIVE | COMMUNITY

## 2022-10-21 RX ORDER — MELATONIN 200 MCG
3 TABLET ORAL
Refills: 0 | Status: DISCONTINUED | COMMUNITY
End: 2022-10-21

## 2022-10-21 RX ORDER — ASPIRIN 81 MG
81 TABLET, DELAYED RELEASE (ENTERIC COATED) ORAL
Refills: 0 | Status: ACTIVE | COMMUNITY

## 2022-10-21 RX ORDER — AMIODARONE HYDROCHLORIDE 200 MG/1
200 TABLET ORAL
Refills: 0 | Status: ACTIVE | COMMUNITY

## 2022-10-21 RX ORDER — TAMSULOSIN HYDROCHLORIDE 0.4 MG/1
CAPSULE ORAL
Refills: 0 | Status: DISCONTINUED | COMMUNITY
End: 2022-10-21

## 2022-10-25 ENCOUNTER — APPOINTMENT (OUTPATIENT)
Dept: UROLOGY | Facility: CLINIC | Age: 62
End: 2022-10-25
Payer: MEDICARE

## 2022-10-25 VITALS
WEIGHT: 290 LBS | BODY MASS INDEX: 41.52 KG/M2 | SYSTOLIC BLOOD PRESSURE: 166 MMHG | HEART RATE: 73 BPM | HEIGHT: 70 IN | DIASTOLIC BLOOD PRESSURE: 90 MMHG

## 2022-10-25 DIAGNOSIS — R33.8 OTHER RETENTION OF URINE: ICD-10-CM

## 2022-10-25 DIAGNOSIS — I10 ESSENTIAL (PRIMARY) HYPERTENSION: ICD-10-CM

## 2022-10-25 DIAGNOSIS — Z86.39 PERSONAL HISTORY OF OTHER ENDOCRINE, NUTRITIONAL AND METABOLIC DISEASE: ICD-10-CM

## 2022-10-25 PROCEDURE — 99204 OFFICE O/P NEW MOD 45 MIN: CPT | Mod: 1L

## 2022-10-25 RX ORDER — CIPROFLOXACIN 3 MG/ML
0.3 SOLUTION OPHTHALMIC
Qty: 10 | Refills: 0 | Status: DISCONTINUED | COMMUNITY
Start: 2022-04-25

## 2022-10-25 RX ORDER — CEPHALEXIN 500 MG/1
500 CAPSULE ORAL
Qty: 10 | Refills: 0 | Status: DISCONTINUED | COMMUNITY
Start: 2022-05-20

## 2022-10-25 RX ORDER — METHOCARBAMOL 750 MG/1
750 TABLET, FILM COATED ORAL
Qty: 20 | Refills: 0 | Status: DISCONTINUED | COMMUNITY
Start: 2022-05-04

## 2022-10-25 RX ORDER — AMIODARONE HYDROCHLORIDE 100 MG/1
100 TABLET ORAL
Qty: 90 | Refills: 0 | Status: DISCONTINUED | COMMUNITY
Start: 2022-06-01

## 2022-10-25 RX ORDER — LEVOTHYROXINE SODIUM 0.1 MG/1
100 TABLET ORAL
Qty: 90 | Refills: 0 | Status: DISCONTINUED | COMMUNITY
Start: 2022-05-25

## 2022-10-25 RX ORDER — SULFAMETHOXAZOLE AND TRIMETHOPRIM 800; 160 MG/1; MG/1
800-160 TABLET ORAL
Qty: 2 | Refills: 0 | Status: DISCONTINUED | COMMUNITY
Start: 2022-09-30

## 2022-10-25 RX ORDER — CEFPODOXIME PROXETIL 200 MG/1
200 TABLET, FILM COATED ORAL
Qty: 14 | Refills: 0 | Status: DISCONTINUED | COMMUNITY
Start: 2022-09-27

## 2022-10-25 NOTE — ASSESSMENT
[FreeTextEntry1] : 61 year old with BPH and lower urinary tract symptoms with hematuria.  The PMHx sig for HTN, AFIB, high cholesterol, hypothyroid and DM.  \par \par 1. Health maintenance -\par     low card diet, exercise, weight goals\par 2. treat new dx of Hypothyroid.\par \par \par 1. 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 Urinary Retention - PAE right GROIN Access\par      PST / Cardiology Clearance - CBC, BMP, and Urine Culture\par      Metformin hold 48 hours before the contrast and PAE\par      No Contrast Allegery\par      españa changed at procedure \par      Plan TOV 2 weeks post\par      assess if he can hold eliquist?  prior to the procedure however, if not we will still continue with the procedure.\par 3. Post TOV 3 month interval CT to assess LN if any changes.\par \par Thank you very much for allowing me to assist in the care of this patient. Should you have any additional questions or concerns please do not hesitate to contact me.\par \par \par Sincerely,\par \par \par Terry Nayak D.O.\par  of Urology and Radiology\par  of Urology at [facility]\par System Director for Prostate Cancer\par [address] [place] [address], 5th [address] [facility], [address]\par Phone: [phone]\par

## 2022-10-25 NOTE — PHYSICAL EXAM
[General Appearance - Well Developed] : well developed [General Appearance - Well Nourished] : well nourished [Edema] : no peripheral edema [Abdomen Soft] : soft [Urethral Meatus] : meatus normal [Penis Abnormality] : normal circumcised penis [] : no rash [No Focal Deficits] : no focal deficits [Oriented To Time, Place, And Person] : oriented to person, place, and time [FreeTextEntry1] : meatus intake

## 2022-10-25 NOTE — HISTORY OF PRESENT ILLNESS
[FreeTextEntry1] : Dear Dr. Alex Ortega\par [address] [place] [address]\par [facility] [address]\par \par Dear Francisco\par Endocrinologist [place]\par \par Thank you so much for the referral to help care for your patient.\par \par Chief Complaint BPH\par Date of first visit: 10/25/22\par Retired -  Shop. 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  The PMHx sig for HTN, AFIB, high cholesterol, hypothyroid and DM.  \par \par The patient has an indwelling españa catheter.  The patient is unable to have a prostate MRI due to defibrillator and stents.  He presents for evaluation for possible treatment options.  10/12/22 Cystoscopy - inflammation and large prostate.  CT Urogram 10/22 - 9 mm AML LEFT, and bladder tic, large prostate and obturator node (right).\par \par Urinary Retention start end of September 2022.  No prior issues. The patient denies prior hx of LUTS.  The patient nocturia and has ERICK.  He is not using mask and feels a little tired. Nocturia x 7 (ave size voids). \par \par PVR 2200\par 10/12/22 US > 100 grams on US\par \par PSA Hx:\par (PSA Value ng/dL ; Date) - PSDA\par 3.59 ng/Ml - 10/19/22\par \par 10/25/2022 \par IPSS X QOL X Urinary Retention\par PATI []  - works\par \par The patient denies fevers, chills, nausea and or vomiting and no unexplained weight loss.\par \par All pertinent laboratory, films and physician notes were reviewed.  Questionnaire results were discussed with patient.\par \par I discussed with the patient and reviewed the risks and benefits and alternatives to prostate artery embolization.  We spent time with the patient discussing alternative therapies including TURP, urolift, medical therapy, laser, HoLEP, SPP and embolization. \par \par We discussed the early result and success of the procedure in general as well as my personal results. Specifically, reviewed risk related to nontarget embolization most commonly involving the bladder and/or rectum. We also spoke about the potential for post procedure obstruction required españa catheter drainage. We reviewed some technical aspects up of the procedure including embolic material utilizing the importance of cone beam CT prior to embolization. The patient wishes procedure scheduling a procedure.\par \par Specific risks of the embolization procedure which were discussed with the patient including infection, bleeding, dysuria, hematuria, hematospermia, non-target embolization which would result in damage to bladder wall leading to ulceration/ischemia, rectal wall injury, and injury to penis and ejaculatory mechanism.  These risks are considered to be low.  Pelvic pain and burning is not uncommon and considered to be mild and transient. The patient understands that a [place] catheter may be inserted during the procedure to help guide embolization and will be removed at the end of the procedure. A small percentage of patients will have some degree of urinary retention after embolization and will require a catheter to be left in. He understands this.  We also discussed that long-term results of prostate embolization are unknown but response rates in reduction of his IPSS score are 80-85% based on current literature and state of the art techniques. \par \par It was explained to the patient that approximate 5% patient experienced some degree bruising following femoral or radial artery catheterization.  The hematoma is benign and resolves spontaneously under typical circumstances. The specific benefits and risks of transradial (TR) access versus transfemoral (TF) access were discussed in detail with the patient. This includes decreased risk of bleeding, immediate ambulation and faster discharge time. Potential increased and extremely remote risk of cerebral emboli was discussed.  The patient was given information packet with further details.\par \par

## 2022-10-25 NOTE — ASSESSMENT
[FreeTextEntry1] : 61 year old with BPH and lower urinary tract symptoms with hematuria.  The PMHx sig for HTN, AFIB, high cholesterol, hypothyroid and DM.  \par \par 1. Health maintenance -\par     low card diet, exercise, weight goals\par 2. treat new dx of Hypothyroid.\par \par \par 1. 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 Urinary Retention - PAE right GROIN Access\par      PST / Cardiology Clearance - CBC, BMP, and Urine Culture\par      Metformin hold 48 hours before the contrast and PAE\par      No Contrast Allegery\par      españa changed at procedure \par      Plan TOV 2 weeks post\par      assess if he can hold eliquist?  prior to the procedure however, if not we will still continue with the procedure.\par 3. Post TOV 3 month interval CT to assess LN if any changes.\par \par Thank you very much for allowing me to assist in the care of this patient. Should you have any additional questions or concerns please do not hesitate to contact me.\par \par \par Sincerely,\par \par \par Terry Nayak D.O.\par  of Urology and Radiology\par  of Urology at [facility]\par System Director for Prostate Cancer\par [address] [place] [address], 5[address] Floor [facility], [address]\par Phone: [phone]\par

## 2022-10-25 NOTE — REVIEW OF SYSTEMS
[Negative] : Heme/Lymph [Feeling Tired] : feeling tired [Heartburn] : heartburn [see HPI] : see HPI [Loss of interest] : loss of interest in sexual activity [Urine Infection (bladder/kidney)] : bladder/kidney infection [Wake up at night to urinate  How many times?  ___] : wakes up to urinate [unfilled] times during the night [Wait a long time to urinate] : waits a long time to urinate [Slow urine stream] : slow urine stream [Bladder fullness after urinating] : bladder fullness after urinating [FreeTextEntry2] : high blood pressure

## 2022-10-25 NOTE — HISTORY OF PRESENT ILLNESS
[FreeTextEntry1] : Dear Dr. Alex Ortega\par [address] [place] [address]\par [facility] [address]\par \par Dear Francisco\par Endocrinologist [place]\par \par Thank you so much for the referral to help care for your patient.\par \par Chief Complaint BPH\par Date of first visit: 10/25/22\par Retired -  Shop. 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  The PMHx sig for HTN, AFIB, high cholesterol, hypothyroid and DM.  \par \par The patient has an indwelling españa catheter.  The patient is unable to have a prostate MRI due to defibrillator and stents.  He presents for evaluation for possible treatment options.  10/12/22 Cystoscopy - inflammation and large prostate.  CT Urogram 10/22 - 9 mm AML LEFT, and bladder tic, large prostate and obturator node (right).\par \par Urinary Retention start end of September 2022.  No prior issues. The patient denies prior hx of LUTS.  The patient nocturia and has ERICK.  He is not using mask and feels a little tired. Nocturia x 7 (ave size voids). \par \par PVR 2200\par 10/12/22 US > 100 grams on US\par \par PSA Hx:\par (PSA Value ng/dL ; Date) - PSDA\par 3.59 ng/Ml - 10/19/22\par \par 10/25/2022 \par IPSS X QOL X Urinary Retention\par PATI []  - works\par \par The patient denies fevers, chills, nausea and or vomiting and no unexplained weight loss.\par \par All pertinent laboratory, films and physician notes were reviewed.  Questionnaire results were discussed with patient.\par \par I discussed with the patient and reviewed the risks and benefits and alternatives to prostate artery embolization.  We spent time with the patient discussing alternative therapies including TURP, urolift, medical therapy, laser, HoLEP, SPP and embolization. \par \par We discussed the early result and success of the procedure in general as well as my personal results. Specifically, reviewed risk related to nontarget embolization most commonly involving the bladder and/or rectum. We also spoke about the potential for post procedure obstruction required españa catheter drainage. We reviewed some technical aspects up of the procedure including embolic material utilizing the importance of cone beam CT prior to embolization. The patient wishes procedure scheduling a procedure.\par \par Specific risks of the embolization procedure which were discussed with the patient including infection, bleeding, dysuria, hematuria, hematospermia, non-target embolization which would result in damage to bladder wall leading to ulceration/ischemia, rectal wall injury, and injury to penis and ejaculatory mechanism.  These risks are considered to be low.  Pelvic pain and burning is not uncommon and considered to be mild and transient. The patient understands that a [place] catheter may be inserted during the procedure to help guide embolization and will be removed at the end of the procedure. A small percentage of patients will have some degree of urinary retention after embolization and will require a catheter to be left in. He understands this.  We also discussed that long-term results of prostate embolization are unknown but response rates in reduction of his IPSS score are 80-85% based on current literature and state of the art techniques. \par \par It was explained to the patient that approximate 5% patient experienced some degree bruising following femoral or radial artery catheterization.  The hematoma is benign and resolves spontaneously under typical circumstances. The specific benefits and risks of transradial (TR) access versus transfemoral (TF) access were discussed in detail with the patient. This includes decreased risk of bleeding, immediate ambulation and faster discharge time. Potential increased and extremely remote risk of cerebral emboli was discussed.  The patient was given information packet with further details.\par \par

## 2022-10-26 ENCOUNTER — OUTPATIENT (OUTPATIENT)
Dept: OUTPATIENT SERVICES | Facility: HOSPITAL | Age: 62
LOS: 1 days | Discharge: HOME | End: 2022-10-26

## 2022-10-26 DIAGNOSIS — S76.109A UNSPECIFIED INJURY OF UNSPECIFIED QUADRICEPS MUSCLE, FASCIA AND TENDON, INITIAL ENCOUNTER: Chronic | ICD-10-CM

## 2022-10-26 DIAGNOSIS — Z95.5 PRESENCE OF CORONARY ANGIOPLASTY IMPLANT AND GRAFT: Chronic | ICD-10-CM

## 2022-10-26 DIAGNOSIS — I82.409 ACUTE EMBOLISM AND THROMBOSIS OF UNSPECIFIED DEEP VEINS OF UNSPECIFIED LOWER EXTREMITY: Chronic | ICD-10-CM

## 2022-10-26 DIAGNOSIS — N40.1 BENIGN PROSTATIC HYPERPLASIA WITH LOWER URINARY TRACT SYMPTOMS: ICD-10-CM

## 2022-10-26 DIAGNOSIS — Z95.810 PRESENCE OF AUTOMATIC (IMPLANTABLE) CARDIAC DEFIBRILLATOR: Chronic | ICD-10-CM

## 2022-10-26 DIAGNOSIS — Z90.49 ACQUIRED ABSENCE OF OTHER SPECIFIED PARTS OF DIGESTIVE TRACT: Chronic | ICD-10-CM

## 2022-10-26 DIAGNOSIS — Z98.890 OTHER SPECIFIED POSTPROCEDURAL STATES: Chronic | ICD-10-CM

## 2022-10-27 ENCOUNTER — NON-APPOINTMENT (OUTPATIENT)
Age: 62
End: 2022-10-27

## 2022-11-02 ENCOUNTER — OUTPATIENT (OUTPATIENT)
Dept: OUTPATIENT SERVICES | Facility: HOSPITAL | Age: 62
LOS: 1 days | End: 2022-11-02

## 2022-11-02 ENCOUNTER — NON-APPOINTMENT (OUTPATIENT)
Age: 62
End: 2022-11-02

## 2022-11-02 ENCOUNTER — APPOINTMENT (OUTPATIENT)
Dept: HEMATOLOGY ONCOLOGY | Facility: CLINIC | Age: 62
End: 2022-11-02

## 2022-11-02 VITALS
OXYGEN SATURATION: 96 % | DIASTOLIC BLOOD PRESSURE: 77 MMHG | SYSTOLIC BLOOD PRESSURE: 152 MMHG | HEART RATE: 65 BPM | HEIGHT: 68.5 IN | RESPIRATION RATE: 16 BRPM | WEIGHT: 289.91 LBS | TEMPERATURE: 97 F

## 2022-11-02 VITALS
BODY MASS INDEX: 43.19 KG/M2 | DIASTOLIC BLOOD PRESSURE: 82 MMHG | TEMPERATURE: 97.1 F | HEIGHT: 69.21 IN | SYSTOLIC BLOOD PRESSURE: 154 MMHG | RESPIRATION RATE: 18 BRPM | WEIGHT: 294.96 LBS | OXYGEN SATURATION: 96 % | HEART RATE: 81 BPM

## 2022-11-02 DIAGNOSIS — Z95.5 PRESENCE OF CORONARY ANGIOPLASTY IMPLANT AND GRAFT: Chronic | ICD-10-CM

## 2022-11-02 DIAGNOSIS — I50.9 HEART FAILURE, UNSPECIFIED: ICD-10-CM

## 2022-11-02 DIAGNOSIS — Z84.89 FAMILY HISTORY OF OTHER SPECIFIED CONDITIONS: ICD-10-CM

## 2022-11-02 DIAGNOSIS — Z98.890 OTHER SPECIFIED POSTPROCEDURAL STATES: Chronic | ICD-10-CM

## 2022-11-02 DIAGNOSIS — Z63.5 DISRUPTION OF FAMILY BY SEPARATION AND DIVORCE: ICD-10-CM

## 2022-11-02 DIAGNOSIS — Z90.49 ACQUIRED ABSENCE OF OTHER SPECIFIED PARTS OF DIGESTIVE TRACT: Chronic | ICD-10-CM

## 2022-11-02 DIAGNOSIS — N40.0 BENIGN PROSTATIC HYPERPLASIA WITHOUT LOWER URINARY TRACT SYMPTOMS: ICD-10-CM

## 2022-11-02 DIAGNOSIS — E03.9 HYPOTHYROIDISM, UNSPECIFIED: ICD-10-CM

## 2022-11-02 DIAGNOSIS — I48.91 UNSPECIFIED ATRIAL FIBRILLATION: ICD-10-CM

## 2022-11-02 DIAGNOSIS — D17.9 BENIGN LIPOMATOUS NEOPLASM, UNSPECIFIED: ICD-10-CM

## 2022-11-02 DIAGNOSIS — Z78.9 OTHER SPECIFIED HEALTH STATUS: ICD-10-CM

## 2022-11-02 DIAGNOSIS — I82.409 ACUTE EMBOLISM AND THROMBOSIS OF UNSPECIFIED DEEP VEINS OF UNSPECIFIED LOWER EXTREMITY: Chronic | ICD-10-CM

## 2022-11-02 DIAGNOSIS — S76.109A UNSPECIFIED INJURY OF UNSPECIFIED QUADRICEPS MUSCLE, FASCIA AND TENDON, INITIAL ENCOUNTER: Chronic | ICD-10-CM

## 2022-11-02 DIAGNOSIS — Z86.718 PERSONAL HISTORY OF OTHER VENOUS THROMBOSIS AND EMBOLISM: ICD-10-CM

## 2022-11-02 DIAGNOSIS — N40.1 BENIGN PROSTATIC HYPERPLASIA WITH LOWER URINARY TRACT SYMPTOMS: ICD-10-CM

## 2022-11-02 DIAGNOSIS — E11.9 TYPE 2 DIABETES MELLITUS WITHOUT COMPLICATIONS: ICD-10-CM

## 2022-11-02 DIAGNOSIS — S76.119A STRAIN OF UNSPECIFIED QUADRICEPS MUSCLE, FASCIA AND TENDON, INITIAL ENCOUNTER: ICD-10-CM

## 2022-11-02 DIAGNOSIS — Z95.810 PRESENCE OF AUTOMATIC (IMPLANTABLE) CARDIAC DEFIBRILLATOR: Chronic | ICD-10-CM

## 2022-11-02 DIAGNOSIS — Z82.3 FAMILY HISTORY OF STROKE: ICD-10-CM

## 2022-11-02 DIAGNOSIS — G47.33 OBSTRUCTIVE SLEEP APNEA (ADULT) (PEDIATRIC): ICD-10-CM

## 2022-11-02 DIAGNOSIS — Z82.49 FAMILY HISTORY OF ISCHEMIC HEART DISEASE AND OTHER DISEASES OF THE CIRCULATORY SYSTEM: ICD-10-CM

## 2022-11-02 LAB
A1C WITH ESTIMATED AVERAGE GLUCOSE RESULT: 9.2 % — HIGH (ref 4–5.6)
ALBUMIN SERPL ELPH-MCNC: 4.3 G/DL — SIGNIFICANT CHANGE UP (ref 3.3–5)
ALP SERPL-CCNC: 76 U/L — SIGNIFICANT CHANGE UP (ref 40–120)
ALT FLD-CCNC: 16 U/L — SIGNIFICANT CHANGE UP (ref 4–41)
ANION GAP SERPL CALC-SCNC: 13 MMOL/L — SIGNIFICANT CHANGE UP (ref 7–14)
APTT BLD: 31.3 SEC — SIGNIFICANT CHANGE UP (ref 27–36.3)
AST SERPL-CCNC: 29 U/L — SIGNIFICANT CHANGE UP (ref 4–40)
BILIRUB SERPL-MCNC: 0.5 MG/DL — SIGNIFICANT CHANGE UP (ref 0.2–1.2)
BUN SERPL-MCNC: 17 MG/DL — SIGNIFICANT CHANGE UP (ref 7–23)
CALCIUM SERPL-MCNC: 9.7 MG/DL — SIGNIFICANT CHANGE UP (ref 8.4–10.5)
CHLORIDE SERPL-SCNC: 100 MMOL/L — SIGNIFICANT CHANGE UP (ref 98–107)
CO2 SERPL-SCNC: 25 MMOL/L — SIGNIFICANT CHANGE UP (ref 22–31)
CREAT SERPL-MCNC: 0.97 MG/DL — SIGNIFICANT CHANGE UP (ref 0.5–1.3)
EGFR: 88 ML/MIN/1.73M2 — SIGNIFICANT CHANGE UP
ESTIMATED AVERAGE GLUCOSE: 217 — SIGNIFICANT CHANGE UP
GLUCOSE SERPL-MCNC: 212 MG/DL — HIGH (ref 70–99)
HCT VFR BLD CALC: 44.2 % — SIGNIFICANT CHANGE UP (ref 39–50)
HGB BLD-MCNC: 14.9 G/DL — SIGNIFICANT CHANGE UP (ref 13–17)
INR BLD: 1.2 RATIO — HIGH (ref 0.88–1.16)
MCHC RBC-ENTMCNC: 32.5 PG — SIGNIFICANT CHANGE UP (ref 27–34)
MCHC RBC-ENTMCNC: 33.7 GM/DL — SIGNIFICANT CHANGE UP (ref 32–36)
MCV RBC AUTO: 96.3 FL — SIGNIFICANT CHANGE UP (ref 80–100)
NRBC # BLD: 0 /100 WBCS — SIGNIFICANT CHANGE UP (ref 0–0)
NRBC # FLD: 0 K/UL — SIGNIFICANT CHANGE UP (ref 0–0)
PLATELET # BLD AUTO: 190 K/UL — SIGNIFICANT CHANGE UP (ref 150–400)
POTASSIUM SERPL-MCNC: 3.7 MMOL/L — SIGNIFICANT CHANGE UP (ref 3.5–5.3)
POTASSIUM SERPL-SCNC: 3.7 MMOL/L — SIGNIFICANT CHANGE UP (ref 3.5–5.3)
PROT SERPL-MCNC: 7.7 G/DL — SIGNIFICANT CHANGE UP (ref 6–8.3)
PROTHROM AB SERPL-ACNC: 13.9 SEC — HIGH (ref 10.5–13.4)
RBC # BLD: 4.59 M/UL — SIGNIFICANT CHANGE UP (ref 4.2–5.8)
RBC # FLD: 13.2 % — SIGNIFICANT CHANGE UP (ref 10.3–14.5)
SODIUM SERPL-SCNC: 138 MMOL/L — SIGNIFICANT CHANGE UP (ref 135–145)
WBC # BLD: 7.51 K/UL — SIGNIFICANT CHANGE UP (ref 3.8–10.5)
WBC # FLD AUTO: 7.51 K/UL — SIGNIFICANT CHANGE UP (ref 3.8–10.5)

## 2022-11-02 PROCEDURE — 99204 OFFICE O/P NEW MOD 45 MIN: CPT

## 2022-11-02 PROCEDURE — 93010 ELECTROCARDIOGRAM REPORT: CPT

## 2022-11-02 RX ORDER — METFORMIN HYDROCHLORIDE 850 MG/1
1 TABLET ORAL
Qty: 0 | Refills: 0 | DISCHARGE

## 2022-11-02 RX ORDER — AMIODARONE HYDROCHLORIDE 400 MG/1
1 TABLET ORAL
Qty: 0 | Refills: 0 | DISCHARGE

## 2022-11-02 RX ORDER — ATORVASTATIN CALCIUM 80 MG/1
1 TABLET, FILM COATED ORAL
Qty: 0 | Refills: 0 | DISCHARGE

## 2022-11-02 RX ORDER — ASPIRIN/CALCIUM CARB/MAGNESIUM 324 MG
1 TABLET ORAL
Qty: 0 | Refills: 0 | DISCHARGE

## 2022-11-02 RX ORDER — APIXABAN 2.5 MG/1
1 TABLET, FILM COATED ORAL
Qty: 0 | Refills: 0 | DISCHARGE

## 2022-11-02 RX ORDER — CARVEDILOL PHOSPHATE 80 MG/1
1 CAPSULE, EXTENDED RELEASE ORAL
Qty: 0 | Refills: 0 | DISCHARGE

## 2022-11-02 RX ORDER — LEVOTHYROXINE SODIUM 125 MCG
1 TABLET ORAL
Qty: 0 | Refills: 0 | DISCHARGE

## 2022-11-02 RX ORDER — SACUBITRIL AND VALSARTAN 24; 26 MG/1; MG/1
1 TABLET, FILM COATED ORAL
Qty: 0 | Refills: 0 | DISCHARGE

## 2022-11-02 SDOH — SOCIAL STABILITY - SOCIAL INSECURITY: DISRUPTION OF FAMILY BY SEPARATION AND DIVORCE: Z63.5

## 2022-11-02 NOTE — REVIEW OF SYSTEMS
[Loss of Hearing] : loss of hearing [Negative] : Gastrointestinal [Fever] : no fever [Chills] : no chills [Fatigue] : no fatigue [Recent Change In Weight] : ~T no recent weight change [Dysphagia] : no dysphagia [Nosebleeds] : no nosebleeds [Hoarseness] : no hoarseness [Odynophagia] : no odynophagia [Chest Pain] : no chest pain [Palpitations] : no palpitations [Lower Ext Edema] : no lower extremity edema [Wheezing] : no wheezing [Cough] : no cough [SOB on Exertion] : no shortness of breath during exertion [Dysuria] : no dysuria [Incontinence] : no incontinence [Joint Pain] : no joint pain [Joint Stiffness] : no joint stiffness [Muscle Pain] : no muscle pain [Skin Rash] : no skin rash [Dizziness] : no dizziness [Difficulty Walking] : no difficulty walking [Anxiety] : no anxiety [Depression] : no depression [Muscle Weakness] : no muscle weakness [Easy Bleeding] : no tendency for easy bleeding [Easy Bruising] : no tendency for easy bruising [FreeTextEntry9] : no cramps [de-identified] : no pruritus [de-identified] : No HA, no paresthesias

## 2022-11-02 NOTE — HISTORY OF PRESENT ILLNESS
[de-identified] : Julian Blankenship is seen in consultation today. He has BPH issues for some time, contemplating ablation of the prostate and has an appointment with Dr Nayak next week. For about 5 years he has had LUTS. Saw a urologist 2 years ago, given meds w/o improvement. Has hesitancy over the past month. No blood, no dysuria. Nocturia hourly, stream is weak, daytime, has less frequency. NO dysuria, no incontinence. Some urgency. Full erections, sexually active, no ejaculate for the past 1-2 years. Libido is normal. On imaging, he was noted to have a renal mass, which is reason for consultation today.  He has CHF, first noted 11 years ago, has 6 stents, all places at the initial diagnosis of ischemic heart disease, denies any MI, says EF was as low as 12%.  he was hospitalized for about 12 days. Has Afib, started 4 years ago. On Eliquis, had Watchman placed. Has had some hematuria recently after Linares placed, present fr about 5 weeks. Appetite is good, no weight loss.NO headaches, no dizziness, no paresthesias. No fatigue.

## 2022-11-02 NOTE — H&P PST ADULT - PROBLEM SELECTOR PLAN 3
Pt instructed to take levothyroxine AM of surgery with a sip of water, pt able to verbalize understanding.

## 2022-11-02 NOTE — H&P PST ADULT - NSICDXPASTSURGICALHX_GEN_ALL_CORE_FT
PAST SURGICAL HISTORY:  Acute DVT (deep venous thrombosis) 2011 RLE "removed"    Biventricular ICD (implantable cardioverter-defibrillator) in place 2011  [employer]  Model [id number]  Serial [id number]    Coronary stent patent x6, all placed 2009    History of appendectomy childhood    History of loop recorder Model LNQ11; [id number] implanted 5/7/19    Injury of quadriceps tendon repair bilateral quadriceps 3/22/21    Stented coronary artery x6, most recent placed 2009

## 2022-11-02 NOTE — H&P PST ADULT - PRO TOBACCO TYPE
Patient Education     Sacroiliitis  The sacrum is the triangle-shaped bone at the base of the spine. It is linked to the other pelvis bones by the sacroiliac joints, also called SI joints. Sacroiliitis is when one or both SI joints are hurt or inflamed. It can make small movements of the lower back and pelvis very painful.        This condition has been linked to other diseases. They include ankylosing spondylitis, rheumatoid arthritis, psoriasis, and Crohn’s disease or colitis. Symptoms may include pain or stiffness in the hips, lower back, thighs, or buttocks. Pain occurs most often in the morning or after sitting for long periods of time. The pain may get worse when you walk. The swinging motion of the hips strains the SI joints.  Sacroiliitis is caused by many factors such as:  · Heavy lifting, especially if not done the right way  · Severe injury, such as a fall or car accident  · Osteoarthritis  · Pregnancy  · Infection of the joint  This condition is hard to diagnose. It may be confused with other causes of low back pain. To confirm the diagnosis, you may be given a shot of numbing medicine in the SI joint. Treatment includes rest, physical therapy, and anti-inflammatory medicines. If another health problem is the cause, then that must also be treated. More testing may be needed if your symptoms don’t get better.  Home care  · If your healthcare provider has prescribed medicines, take all of them as directed.  · You may use over-the-counter pain medicine to control pain, unless another medicine was prescribed. If prednisone was prescribed, don’t use NSAIDs, or nonsteroidal anti-inflammatory drugs, such as ibuprofen or naproxen. Talk with your provider before using this medicine if you have chronic liver or kidney disease or ever had a stomach ulcer or GI bleeding.  · If you were referred to physical therapy, make an appointment. Be sure to do any prescribed exercises.  · Don’t smoke. Smoking reduces blood  flow to the inflamed area. This makes it harder to treat.  Follow-up care  Follow up with your healthcare provider, or as advised.  If you had an X-ray or an MRI, you will be notified of any new findings that may affect your care.  When to seek medical advice  Contact your healthcare provider right away if any of these occur:  · Increasing low back pain  · Inflammation of the eyes  · Skin rash or redness  · Weakness or numbness in one or both legs  · Loss of bowel or bladder control  · Numbness in the groin area  Date Last Reviewed: 11/24/2015 © 2000-2018 [employer]. [address] [place], [place] [id number]. All rights reserved. This information is not intended as a substitute for professional medical care. Always follow your healthcare professional's instructions.                   Sacroiliac Joint Information and Home Exercise Program    The sacroiliac joint or \"SI\" joint is an irregularly shaped joint which joins the base of the spine (sacrum) with the pelvic bone (ilium). The pubic bone, which is part of the pelvis, is also a joint which moves in synchrony with the \"SI\" joint. These joints all move slightly during bending, walking, etc. If trauma or repetitive overuse occurs, the motion in these joints may increase and become \"hypermobile\" (looser than normal), causing excessive wear and tear and pain.    To prevent irritation of a \"SI\" joint problem follow these recommendations:  · Don't skip steps up or downstairs.  · Don't jump on one leg.  · Don't take long steps.  · Don't cross your legs. Do sit with your legs wide. Maintain erect posture.  · Don't stand with weight on one side.  · Don't carry heavy objects (groceries, baby) on one side (hip or shoulder).  · Use caution during sexual activity. (If you have question, consult your therapist).  · Avoid having partner bearing weight on you. Be in a non-weight bearing position.  · Avoid positions which causes asymmetry (increased weight bearing  on one side more than the other).     Leg Pull  To start, lie on your back with your knees bent and feet flat on the floor. Don’t press your neck or lower back to the floor. Breathe deeply. You should feel comfortable and relaxed in this position.  · Pull one knee to your chest.  · Hold for 30-60 seconds. Return to starting position.  · Repeat 2 times.  · Switch legs.  · For a double leg pull, pull both legs to your chest at the same time. Repeat 2 times.                  Partial Curl-Ups  To start, lie on your back with your knees bent and feet flat on the floor. Don’t press your neck or lower back to the floor. Breathe deeply. You should feel comfortable and relaxed in this position.  · Cross your arms loosely.  · Tighten your abdomen and curl longterm up, keeping your head in line with your shoulders.  · Hold for 5 seconds. Uncurl to lie down.  · Repeat 5 times.                       Knee Lift    To start, lie on your back with your knees bent and feet flat on the floor. Don’t press your neck or lower back to the floor. Breathe deeply. You should feel comfortable and relaxed in this position.  · Lift one bent knee and move it toward your upper body. Keep your abdominal muscles tight and your back flat on the floor.  · Hold for 10 seconds. Return to start position.  · Repeat 3 times.  · Switch legs.                  Lower Back Rotation  To start, lie on your back with your knees bent and feet flat on the floor. Don’t press your neck or lower back to the floor. Breathe deeply. You should feel comfortable and relaxed in this position.  · Drop both knees to one side. Turn your head to the other side. Keep your shoulders flat on the floor.  · Hold for 20 seconds.  · Slowly switch sides.  · Repeat 2 times.                  Leg Reach  Do this exercise on your hands and knees. Keep your knees under your hips and your hands under your shoulders. Keep your spine in a neutral position (not arched or sagging). Be sure to  maintain your neck’s natural curve.  · Extend one leg straight back. Don’t arch your back or let your head or body sag.  · Hold for 5 seconds. Return to starting position.  · Repeat 5 times.  · Switch legs.             Hip Flexor  This exercise stretches and strengthens your lower body to help your back. Do the exercise as often as suggested by your healthcare provider. As you work out, don’t rush or strain. Use an exercise mat, pillow, or folded towel to protect your knees and other sensitive areas.  · Kneel on the floor. Put one foot on the floor in front of you, with the knee slightly bent. If you need to, hold on to a chair for balance. Tighten your abdomen.  · Move your hips forward, keeping your back and shoulders upright. Feel the stretch in the front of your hip.  · Hold for 30-60 seconds. Return to starting position.  · Repeat 2 times. Switch sides.               Side Stretch  To start, sit in a chair with your feet flat on the floor. Shift your weight slightly forward to avoid rounding your back. Relax. Keep your ears, shoulders, and hips aligned.  · Stretch your right arm overhead.  · Slowly bend to the left. Don’t twist your torso.  · Hold for 20 seconds. Return to starting position.  · Repeat 2 times. Then, switch to the other side.          Seated Rotation  To start, sit in a chair with your feet flat on the floor. Shift your weight slightly forward to avoid rounding your back. Relax, and keep your ears, shoulders, and hips aligned.  · Fold your arms, elbows just below shoulder height.  · Turn from the waist with hips forward. Turn your head last.  · Hold for a count of 5. Return to starting position.  · Repeat 5 times on one side. Then switch sides.          Lower Back Stretch  To start, sit in a chair with your feet flat on the floor. Shift your weight slightly forward to avoid rounding your back. Relax, and keep your ears, shoulders, and hips aligned.  · Sit with your feet well apart.  · Bend  forward and touch the floor with the backs of your hands. Relax and let your body drop.  · Hold for 20 seconds. Return to starting position.  · Repeat 2 times.          as teen/cigarettes

## 2022-11-02 NOTE — H&P PST ADULT - HISTORY OF PRESENT ILLNESS
60 yo male with preop dx. BPH presents to pre surgical testing.  Pt with urinary retention since 9/2022, urinary catheter placed.  Pt s/p cystoscopy and CT urogram.  Pt is scheduled for prostate artery embolization.

## 2022-11-02 NOTE — H&P PST ADULT - NSICDXPASTMEDICALHX_GEN_ALL_CORE_FT
PAST MEDICAL HISTORY:  BPH (benign prostatic hyperplasia)     CAD (coronary artery disease)     Cardiomyopathy     Chronic diastolic congestive heart failure     DM (diabetes mellitus) type 2    Effusion of both knee joints     H/O: hypothyroidism     History of BPH     HLD (hyperlipidemia)     HTN (hypertension)     Morbid obesity     ERICK (obstructive sleep apnea) no CPAP, >5 year    Paroxysmal A-fib     Syncope

## 2022-11-02 NOTE — H&P PST ADULT - PROBLEM SELECTOR PLAN 1
Pt is scheduled for prostate artery embolization on 11/9/22.  Verbal and written pre op instructions reviewed with patient and pt able to verbalize understanding.   Verbal and written instructions given with chlorhexidine wash, pt able to verbalize understanding via teach back method.  Pt instructed to follow surgeon's guidelines regarding COVID testing preop.

## 2022-11-02 NOTE — H&P PST ADULT - PROBLEM SELECTOR PLAN 5
Per pt, he obtained cardiac eval last week per surgeon's request, pt instructed to continue aspirin preop and to take last dose of Eliquis preop on 11/6/22.  Will obtain note.  Pt instructed to take carvedilol and amiodarone AM of surgery with a sip of water, pt able to verbalize understanding.

## 2022-11-02 NOTE — PHYSICAL EXAM
[Fully active, able to carry on all pre-disease performance without restriction] : Status 0 - Fully active, able to carry on all pre-disease performance without restriction [Obese] : obese [Normal] : clear to auscultation bilaterally, no dullness, no wheezing [de-identified] : no edema [de-identified] : circumcised, glans normal, meatus normal, Linares in place

## 2022-11-02 NOTE — ASSESSMENT
[Palliative Care Plan] : not applicable at this time [FreeTextEntry1] : Julian Blankenship is seen in the office in consultation today.  I contacted him over the weekend, as he has been on multiple prostate issues but no diagnosis of prostate cancer.  He tells me its for the small finding his kidney but he is here to see me, and the radiologist has characterize this as an angiomyolipoma.  He thought it was very large and that it requires some evaluation.\par \etelvina Has had prostatic issues for about 5 years minimal.  He is contemplating ablation of the prostate and he has an appointment with Dr. Nayak next week for the procedure.  He has a Linares catheter in place this is been present for the last 5 weeks or so.  He saw another urologist previously but did not care for the urologist.  He was given medications for his LUTS, but he had no improvement.  He has had hesitancy in the month prior to having the Linares placed.  There is no hematuria dysuria.  He had nocturia on an hourly basis.  The stream was weak and during the day he had somewhat less frequency.  There was no dysuria or incontinence.  He did have some urgency.  Libido is normal and he states that he has full erections and he sexually active.  He has not noted any ejaculate for the last 2 years or so.  On imaging, he was noted to have a renal mass and this was the reason for the consultation today.  He has a history of congestive heart failure which was first noted 11 years ago and he had 6 stents placed or placed at the time of the initial diagnosis of ischemic heart disease.  He denies any history of an MI.  He says that his ejection fraction was as low as 12% back then.  He was hospitalized for approximately 2 weeks at that time.  He has atrial fibrillation which started 4 weeks ago.  He is on Eliquis.  He had a watchman device placed.  He has had some hematuria recently after the Linares catheter was placed.  His appetite is good and there is no weight loss.  He denies any headaches dizziness.  There were no paresthesias or fatigue.  He does have some hearing loss.\par \par On physical examination, he appears in no acute distress.  His performance status is 0.  His blood pressure was 154/82.  His weight was 133.8 kg\par \par There is no palpable adenopathy present.  Chest is clear and the heart examination is normal.  The abdomen is obese.  No masses are appreciated.  There is no edema of the extremities.  The phallus is circumcised.  The glans is normal.  The meatus is normal position.  A Linares catheter is in place.  There is no spinal column or chest wall tenderness to palpation or percussion.\par \par I reviewed the imaging studies with him.  He brought the disc, but says he wanted it back right away.  I did not have it downloaded to our system as I do not think it is essential that it be downloaded.  I went over the report line by line with him.  This is a 9 mm angiomyolipoma.  He asked how big the kidney is and I described to him what the size of this tumor is the fact that they are saying that there is fat within this tumor and they are definitive about the diagnosis of angiomyolipoma means that this is almost certainly a benign tumor, but it should be followed over time.  I explained how small renal masses are often followed as even renal cell carcinoma less than 3 cm rarely metastasizes.  There is no point in performing any treatment for this small lesion and observation is all that is required.  In the absence of any diagnosis of malignancy, this is not something that he has to return to see me about.  His urologist can easily follow him as well, and I explained to them that urology usually does follow small renal lesions.\par \par All questions were answered to the best of my ability and to his apparent satisfaction.

## 2022-11-02 NOTE — H&P PST ADULT - PROBLEM SELECTOR PLAN 2
Pt instructed to take entresto AM of surgery with a sip of water, pt able to verbalize understanding.   ICD information faxed to OR booking.

## 2022-11-07 PROBLEM — E11.9 TYPE 2 DIABETES MELLITUS WITHOUT COMPLICATIONS: Chronic | Status: ACTIVE | Noted: 2022-11-02

## 2022-11-07 PROBLEM — G47.33 OBSTRUCTIVE SLEEP APNEA (ADULT) (PEDIATRIC): Chronic | Status: ACTIVE | Noted: 2021-03-22

## 2022-11-07 LAB — SARS-COV-2 N GENE NPH QL NAA+PROBE: NOT DETECTED

## 2022-11-09 ENCOUNTER — OUTPATIENT (OUTPATIENT)
Dept: OUTPATIENT SERVICES | Facility: HOSPITAL | Age: 62
LOS: 1 days | End: 2022-11-09

## 2022-11-09 ENCOUNTER — RESULT REVIEW (OUTPATIENT)
Age: 62
End: 2022-11-09

## 2022-11-09 ENCOUNTER — APPOINTMENT (OUTPATIENT)
Dept: UROLOGY | Facility: AMBULATORY SURGERY CENTER | Age: 62
End: 2022-11-09

## 2022-11-09 DIAGNOSIS — Z95.5 PRESENCE OF CORONARY ANGIOPLASTY IMPLANT AND GRAFT: Chronic | ICD-10-CM

## 2022-11-09 DIAGNOSIS — I82.409 ACUTE EMBOLISM AND THROMBOSIS OF UNSPECIFIED DEEP VEINS OF UNSPECIFIED LOWER EXTREMITY: Chronic | ICD-10-CM

## 2022-11-09 DIAGNOSIS — R31.9 HEMATURIA, UNSPECIFIED: ICD-10-CM

## 2022-11-09 DIAGNOSIS — S76.109A UNSPECIFIED INJURY OF UNSPECIFIED QUADRICEPS MUSCLE, FASCIA AND TENDON, INITIAL ENCOUNTER: Chronic | ICD-10-CM

## 2022-11-09 DIAGNOSIS — Z98.890 OTHER SPECIFIED POSTPROCEDURAL STATES: Chronic | ICD-10-CM

## 2022-11-09 DIAGNOSIS — Z90.49 ACQUIRED ABSENCE OF OTHER SPECIFIED PARTS OF DIGESTIVE TRACT: Chronic | ICD-10-CM

## 2022-11-09 DIAGNOSIS — Z95.810 PRESENCE OF AUTOMATIC (IMPLANTABLE) CARDIAC DEFIBRILLATOR: Chronic | ICD-10-CM

## 2022-11-09 LAB
GLUCOSE BLDC GLUCOMTR-MCNC: 206 MG/DL — HIGH (ref 70–99)
GLUCOSE BLDC GLUCOMTR-MCNC: 224 MG/DL — HIGH (ref 70–99)

## 2022-11-09 PROCEDURE — 76377 3D RENDER W/INTRP POSTPROCES: CPT | Mod: 26,GC

## 2022-11-09 PROCEDURE — 76937 US GUIDE VASCULAR ACCESS: CPT | Mod: 26

## 2022-11-09 PROCEDURE — 36247 INS CATH ABD/L-EXT ART 3RD: CPT | Mod: 59

## 2022-11-09 PROCEDURE — 37243 VASC EMBOLIZE/OCCLUDE ORGAN: CPT

## 2022-11-10 ENCOUNTER — NON-APPOINTMENT (OUTPATIENT)
Age: 62
End: 2022-11-10

## 2022-11-15 DIAGNOSIS — N13.9 OBSTRUCTIVE AND REFLUX UROPATHY, UNSPECIFIED: ICD-10-CM

## 2022-11-15 DIAGNOSIS — N40.1 BENIGN PROSTATIC HYPERPLASIA WITH LOWER URINARY TRACT SYMPTOMS: ICD-10-CM

## 2022-11-21 NOTE — PROGRESS NOTE ADULT - SUBJECTIVE AND OBJECTIVE BOX
S/P Bilateral Quadriceps Repair  POD#2    Patient was seen and examined by me today.   There were no acute events noted overnight .   Patient is lying in bed.  He states that he is feeling better   today.   His pain is more manageable and he is able to move legs from side to side.   Patient is now in bilateral rosa braces but unable to flex either   leg 2/2 to repair.   Patient is obese and it is very difficult to move him from bed.  Patient states that his orthopedist does not want him OOB.   Will discuss with Dr Caro.    Vital Signs Last 24 Hrs  T(C): 36.5 (24 Mar 2021 08:29), Max: 36.9 (23 Mar 2021 23:24)  T(F): 97.7 (24 Mar 2021 08:29), Max: 98.4 (23 Mar 2021 23:24)  HR: 72 (24 Mar 2021 08:58) (62 - 82)  BP: 109/56 (24 Mar 2021 08:29) (96/57 - 139/77)  RR: 13 (24 Mar 2021 08:29) (13 - 18)  SpO2: 93% (24 Mar 2021 08:58) (93% - 95%)    PAST MEDICAL & SURGICAL HISTORY:  Chronic diastolic congestive heart failure  BPH (benign prostatic hyperplasia)  Morbid obesity  HLD (hyperlipidemia)  Effusion of both knee joints  HTN (hypertension)  ERICK (obstructive sleep apnea)  Syncope  Cardiomyopathy  Paroxysmal A-fib  CAD (coronary artery disease)  History of appendectomy  Acute DVT (deep venous thrombosis)  Biventricular ICD (implantable cardioverter-defibrillator) in place  Coronary stent patent      MEDICATIONS  (STANDING):  acetaminophen   Tablet .. 975 milliGRAM(s) Oral every 8 hours  aMIOdarone    Tablet 200 milliGRAM(s) Oral daily  apixaban 5 milliGRAM(s) Oral two times a day  atorvastatin 40 milliGRAM(s) Oral at bedtime  carvedilol 25 milliGRAM(s) Oral two times a day  finasteride 5 milliGRAM(s) Oral daily  influenza   Vaccine 0.5 milliLiter(s) IntraMuscular once  melatonin 3 milliGRAM(s) Oral at bedtime  ondansetron Injectable 4 milliGRAM(s) IV Push once  oxybutynin 10 milliGRAM(s) Oral daily  pantoprazole    Tablet 40 milliGRAM(s) Oral before breakfast  polyethylene glycol 3350 17 Gram(s) Oral at bedtime  sacubitril 49 mG/valsartan 51 mG 1 Tablet(s) Oral two times a day  senna 2 Tablet(s) Oral at bedtime  tamsulosin 0.4 milliGRAM(s) Oral at bedtime    PE:  Alert and oriented X 3  Gen:  morbid obesity  lungs:  CTA  Cor:  RR S1S2  Abd:  obese, +BS -BM, voiding , non tender to palpation  Ext:    Bilateral legs in rosa braces, Dressings clean , dry and intact .  Right and left foot warm, Good ROM , + pulses ,                         12.5   11.87 )-----------( 190      ( 24 Mar 2021 05:30 )             37.6   03-24    141  |  104  |  25<H>  ----------------------------<  111<H>  3.8   |  27  |  1.20    Ca    8.8      24 Mar 2021 05:30           Cheek Interpolation Flap Text: A decision was made to reconstruct the defect utilizing an interpolation axial flap and a staged reconstruction.  A telfa template was made of the defect.  This telfa template was then used to outline the Cheek Interpolation flap.  The donor area for the pedicle flap was then injected with anesthesia.  The flap was excised through the skin and subcutaneous tissue down to the layer of the underlying musculature.  The interpolation flap was carefully excised within this deep plane to maintain its blood supply.  The edges of the donor site were undermined.   The donor site was closed in a primary fashion.  The pedicle was then rotated into position and sutured.  Once the tube was sutured into place, adequate blood supply was confirmed with blanching and refill.  The pedicle was then wrapped with xeroform gauze and dressed appropriately with a telfa and gauze bandage to ensure continued blood supply and protect the attached pedicle.

## 2022-12-13 ENCOUNTER — APPOINTMENT (OUTPATIENT)
Dept: UROLOGY | Facility: CLINIC | Age: 62
End: 2022-12-13
Payer: MEDICARE

## 2022-12-13 VITALS
DIASTOLIC BLOOD PRESSURE: 83 MMHG | WEIGHT: 286 LBS | SYSTOLIC BLOOD PRESSURE: 155 MMHG | BODY MASS INDEX: 41.88 KG/M2 | TEMPERATURE: 97.7 F | HEART RATE: 79 BPM | RESPIRATION RATE: 17 BRPM | HEIGHT: 69.21 IN

## 2022-12-13 DIAGNOSIS — N40.1 BENIGN PROSTATIC HYPERPLASIA WITH LOWER URINARY TRACT SYMPMS: ICD-10-CM

## 2022-12-13 DIAGNOSIS — N13.8 BENIGN PROSTATIC HYPERPLASIA WITH LOWER URINARY TRACT SYMPMS: ICD-10-CM

## 2022-12-13 PROCEDURE — 99212 OFFICE O/P EST SF 10 MIN: CPT

## 2022-12-13 NOTE — HISTORY OF PRESENT ILLNESS
[FreeTextEntry1] : Dear Dr. Alex Ortega\par [address] [place] [address]\par [facility] [address]\par \par Dear Francisco\par Endocrinologist [place]\par \par Thank you so much for the referral to help care for your patient.\par \par Chief Complaint BPH s/p b/l PAE w/ n-BCA on 11/9/22\par Date of first visit: 10/25/22\par Retired -  Shop. 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  The PMHx sig for HTN, AFIB, high cholesterol, hypothyroid and DM.  \par \par The patient has an indwelling españa catheter.  The patient is unable to have a prostate MRI due to defibrillator and stents.  He presents for evaluation for possible treatment options.  10/12/22 Cystoscopy - inflammation and large prostate.  CT Urogram 10/22 - 9 mm AML LEFT, and bladder tic, large prostate and obturator node (right). Urinary Retention start end of September 2022.  No prior issues. The patient denies prior hx of LUTS.  The patient nocturia and has ERICK.  He is not using mask and feels a little tired. Nocturia x 7 (ave size voids). \par \par Pt underwent an uncomplicated b/l PAE w/ n-BCA on 11/9/22. Post-PAE, pat states he is doing wonderfully well.  States "I am peeing like I'm a young man again."  Catheter was removed two week post procedure by Dr. Ortega, and pt successfully passed his TOV.  He states that previously he was waking up hourly to urinate at night, and now he has nocturia x1-2 at most.  Hst states that his stream is is strong and come out easily.  Denies gross hematuria, dysuria, fever, chills, N/V.  had no complications of pain after the procedure.  \par \par PVR 2200\par 10/12/22 US > 100 grams on US\par \par PSA Hx:\par (PSA Value ng/dL ; Date) - PSDA\par 3.59 ng/Ml - 10/19/22\par \par 12/13/2022\par IPSS 3        QOL 0\par PATI - not recorded\par Flow (after double voiding): Qmax 13.3 ml/s, avg 5 ml/s, vv 111 cc\par PVR: 72cc\par \par 10/25/2022 \par IPSS X QOL X Urinary Retention\par PATI []  - works\par \par The patient denies fevers, chills, nausea and or vomiting and no unexplained weight loss.\par \par All pertinent laboratory, films and physician notes were reviewed.  Questionnaire results were discussed with patient.\par \par I discussed with the patient and reviewed the risks and benefits and alternatives to prostate artery embolization.  We spent time with the patient discussing alternative therapies including TURP, urolift, medical therapy, laser, HoLEP, SPP and embolization. \par \par We discussed the early result and success of the procedure in general as well as my personal results. Specifically, reviewed risk related to nontarget embolization most commonly involving the bladder and/or rectum. We also spoke about the potential for post procedure obstruction required españa catheter drainage. We reviewed some technical aspects up of the procedure including embolic material utilizing the importance of cone beam CT prior to embolization. The patient wishes procedure scheduling a procedure.\par \par Specific risks of the embolization procedure which were discussed with the patient including infection, bleeding, dysuria, hematuria, hematospermia, non-target embolization which would result in damage to bladder wall leading to ulceration/ischemia, rectal wall injury, and injury to penis and ejaculatory mechanism.  These risks are considered to be low.  Pelvic pain and burning is not uncommon and considered to be mild and transient. The patient understands that a [place] catheter may be inserted during the procedure to help guide embolization and will be removed at the end of the procedure. A small percentage of patients will have some degree of urinary retention after embolization and will require a catheter to be left in. He understands this.  We also discussed that long-term results of prostate embolization are unknown but response rates in reduction of his IPSS score are 80-85% based on current literature and state of the art techniques. \par \par It was explained to the patient that approximate 5% patient experienced some degree bruising following femoral or radial artery catheterization.  The hematoma is benign and resolves spontaneously under typical circumstances. The specific benefits and risks of transradial (TR) access versus transfemoral (TF) access were discussed in detail with the patient. This includes decreased risk of bleeding, immediate ambulation and faster discharge time. Potential increased and extremely remote risk of cerebral emboli was discussed.  The patient was given information packet with further details.\par \par

## 2022-12-13 NOTE — PHYSICAL EXAM
[General Appearance - Well Developed] : well developed [General Appearance - Well Nourished] : well nourished [Normal Appearance] : normal appearance [Well Groomed] : well groomed [General Appearance - In No Acute Distress] : no acute distress [Abdomen Soft] : soft [Abdomen Tenderness] : non-tender [Abdomen Mass (___ Cm)] : no abdominal mass palpated [Costovertebral Angle Tenderness] : no ~M costovertebral angle tenderness [Skin Color & Pigmentation] : normal skin color and pigmentation [Edema] : no peripheral edema [] : no respiratory distress [Exaggerated Use Of Accessory Muscles For Inspiration] : no accessory muscle use [Oriented To Time, Place, And Person] : oriented to person, place, and time [Affect] : the affect was normal [Mood] : the mood was normal [Not Anxious] : not anxious [No Focal Deficits] : no focal deficits

## 2022-12-13 NOTE — ASSESSMENT
[FreeTextEntry1] : 61 year old with BPH and lower urinary tract symptoms with hematuria.  The PMHx sig for HTN, AFIB, high cholesterol, hypothyroid and DM.  \par \par 1. Health maintenance -\par     low card diet, exercise, weight goals\par 2. treat new dx of Hypothyroid.\par \par \par 1. 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 Cuevas, nothing to do\par 2. BPH - PVR/Flow in 6 months\par \par Thank you very much for allowing me to assist in the care of this patient. Should you have any additional questions or concerns please do not hesitate to contact me.\par \par \par Sincerely,\par \par \par Terry Nayak D.O.\par  of Urology and Radiology\par  of Urology at [facility]\par System Director for Prostate Cancer\par [address] [facility], [address]\par Phone: [phone]\par

## 2023-03-14 NOTE — ASU PREOP CHECKLIST - NSWEIGHTCALCTOOLDRUG_GEN_A_CORE
used Cibinqo Counseling: I discussed with the patient the risks of Cibinqo therapy including but not limited to common cold, nausea, headache, cold sores, increased blood CPK levels, dizziness, UTIs, fatigue, acne, and vomitting. Live vaccines should be avoided.  This medication has been linked to serious infections; higher rate of mortality; malignancy and lymphoproliferative disorders; major adverse cardiovascular events; thrombosis; thrombocytopenia and lymphopenia; lipid elevations; and retinal detachment.

## 2023-06-13 ENCOUNTER — APPOINTMENT (OUTPATIENT)
Dept: UROLOGY | Facility: CLINIC | Age: 63
End: 2023-06-13

## 2023-06-13 NOTE — ASSESSMENT
[FreeTextEntry1] : 62 year old with BPH and lower urinary tract symptoms with hematuria.  The PMHx sig for HTN, AFIB, high cholesterol, hypothyroid and DM.  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 Cuevas, nothing to do\par \par BPH + retention s/p b/l PAE w/ n-BCA on 11/9/22\par - PVR/Flow today ***********\par \par Thank you very much for allowing me to assist in the care of this patient. Should you have any additional questions or concerns please do not hesitate to contact me.\par \par \par Sincerely,\par \par \par Terry Nayak D.O.\par Professor of Urology and Radiology\par  of Urology at [facility]\par System Director for Prostate Cancer\par [address] [facility], [address]\par Phone: [phone]

## 2023-06-13 NOTE — HISTORY OF PRESENT ILLNESS
[FreeTextEntry1] : Dear Dr. Alex Ortega\par [address] [place] [address]\par [facility] [address]\par \par Dear Francisco\par Endocrinologist [place]\par \par Thank you so much for the referral to help care for your patient.\par \par Chief Complaint BPH s/p b/l PAE w/ n-BCA on 11/9/22\par Date of first visit: 10/25/22\par Retired -  Shop. 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  The PMHx sig for HTN, AFIB, high cholesterol, hypothyroid and DM.  \par \par The patient has an indwelling [place] catheter.  The patient is unable to have a prostate MRI due to defibrillator and stents.  He presents for evaluation for possible treatment options.  10/12/22 Cystoscopy - inflammation and large prostate.  CT Urogram 10/22 - 9 mm AML LEFT, and bladder tic, large prostate and obturator node (right). Urinary Retention started at the end of September 2022.  No prior issues. The patient denies prior hx of LUTS.  The patient nocturia and has ERICK.  He is not using mask and feels a little tired. Nocturia x 7 (ave size voids). \par \par Pt underwent an uncomplicated b/l PAE w/ n-BCA on 11/9/22. Post-PAE, pat states he is doing wonderfully well.  States "I am peeing like I'm a young man again."  Catheter was removed two week post procedure by Dr. Ortega, and pt successfully passed his TOV.  He states that previously he was waking up hourly to urinate at night, and now he has nocturia x1-2 at most.  Hst states that his stream is is strong and come out easily.  Denies gross hematuria, dysuria, fever, chills, N/V.  had no complications of pain after the procedure.  \par \par PVR 2200\par 10/12/22 US > 100 grams on US\par \par PSA Hx:\par (PSA Value ng/dL ; Date) - PSDA\par 3.59 ng/Ml - 10/19/22\par \par 06/13/2023\par IPSS      QOL\par PATI\par Flow/PVR:\par \par 12/13/2022\par IPSS 3        QOL 0\par PATI - not recorded\par Flow (after double voiding): Qmax 13.3 ml/s, avg 5 ml/s, vv 111 cc\par PVR: 72cc\par \par 10/25/2022 \par IPSS X QOL X Urinary Retention\par PATI []  - works\par \par The patient denies fevers, chills, nausea and or vomiting and no unexplained weight loss.\par \par All pertinent laboratory, films and physician notes were reviewed.  Questionnaire results were discussed with patient.\par \par I discussed with the patient and reviewed the risks and benefits and alternatives to prostate artery embolization.  We spent time with the patient discussing alternative therapies including TURP, urolift, medical therapy, laser, HoLEP, SPP and embolization. \par \par We discussed the early result and success of the procedure in general as well as my personal results. Specifically, reviewed risk related to nontarget embolization most commonly involving the bladder and/or rectum. We also spoke about the potential for post procedure obstruction required españa catheter drainage. We reviewed some technical aspects up of the procedure including embolic material utilizing the importance of cone beam CT prior to embolization. The patient wishes procedure scheduling a procedure.\par \par Specific risks of the embolization procedure which were discussed with the patient including infection, bleeding, dysuria, hematuria, hematospermia, non-target embolization which would result in damage to bladder wall leading to ulceration/ischemia, rectal wall injury, and injury to penis and ejaculatory mechanism.  These risks are considered to be low.  Pelvic pain and burning is not uncommon and considered to be mild and transient. The patient understands that a [place] catheter may be inserted during the procedure to help guide embolization and will be removed at the end of the procedure. A small percentage of patients will have some degree of urinary retention after embolization and will require a catheter to be left in. He understands this.  We also discussed that long-term results of prostate embolization are unknown but response rates in reduction of his IPSS score are 80-85% based on current literature and state of the art techniques. \par \par It was explained to the patient that approximate 5% patient experienced some degree bruising following femoral or radial artery catheterization.  The hematoma is benign and resolves spontaneously under typical circumstances. The specific benefits and risks of transradial (TR) access versus transfemoral (TF) access were discussed in detail with the patient. This includes decreased risk of bleeding, immediate ambulation and faster discharge time. Potential increased and extremely remote risk of cerebral emboli was discussed.  The patient was given information packet with further details.

## 2023-08-09 NOTE — H&P PST ADULT - WEIGHT IN KG
Physical Therapy    Patient not seen in therapy.       Attempted to see patient for PT session; however, patient requesting to finish eating first. Will attempt later as schedule allows.          OBJECTIVE                      Documented in the chart in the following areas: Assessment/Plan.        Therapy procedure time and total treatment time can be found documented on the Time Entry flowsheet   131.5

## 2025-01-17 NOTE — H&P PST ADULT - MARITAL STATUS
Patient arrives via PV, ambulatory with walker, with CC of dizziness and concern for UTI. Dizzy x5 days. AXO 4   Was seen earlier at a clinic  
Single

## 2025-02-12 ENCOUNTER — APPOINTMENT (OUTPATIENT)
Dept: ORTHOPEDIC SURGERY | Facility: CLINIC | Age: 65
End: 2025-02-12
Payer: MEDICARE

## 2025-02-12 DIAGNOSIS — M17.0 BILATERAL PRIMARY OSTEOARTHRITIS OF KNEE: ICD-10-CM

## 2025-02-12 PROCEDURE — 20610 DRAIN/INJ JOINT/BURSA W/O US: CPT | Mod: RT

## 2025-02-12 PROCEDURE — 99204 OFFICE O/P NEW MOD 45 MIN: CPT | Mod: 25

## 2025-02-12 PROCEDURE — 73564 X-RAY EXAM KNEE 4 OR MORE: CPT | Mod: RT

## 2025-02-13 LAB
B PERT IGG+IGM PNL SER: ABNORMAL
COLOR FLD: NORMAL
EOSINOPHIL # FLD MANUAL: 0 %
FLUID INTAKE SUBSTANCE CLASS: NORMAL
LYMPHOCYTES # FLD MANUAL: 5 %
MESOTHL CELL NFR FLD: 0 %
MONOS+MACROS NFR FLD MANUAL: 16 %
NEUTS SEG # FLD MANUAL: 79 %
NRBC # FLD: 0 %
RBC # FLD MANUAL: NORMAL CELLS/UL
SYCRY CLARITY: ABNORMAL
SYCRY COLOR: ABNORMAL
SYCRY ID: ABNORMAL
SYCRY TUBE: NORMAL
TOTAL CELLS COUNTED FLD: 715 CELLS/UL
TUBE TYPE: NORMAL
UNIDENT CELLS NFR FLD MANUAL: 0 %
VARIANT LYMPHS # FLD MANUAL: 0 %
WBC COUNT: 708 CELLS/UL

## 2025-02-22 LAB — JOINT CULTURE: NORMAL

## 2025-04-30 ENCOUNTER — APPOINTMENT (OUTPATIENT)
Dept: ORTHOPEDIC SURGERY | Facility: CLINIC | Age: 65
End: 2025-04-30
Payer: MEDICARE

## 2025-04-30 VITALS — HEIGHT: 69.21 IN | BODY MASS INDEX: 41.88 KG/M2 | WEIGHT: 286 LBS

## 2025-04-30 DIAGNOSIS — M10.9 GOUT, UNSPECIFIED: ICD-10-CM

## 2025-04-30 DIAGNOSIS — M25.562 PAIN IN LEFT KNEE: ICD-10-CM

## 2025-04-30 PROCEDURE — 73564 X-RAY EXAM KNEE 4 OR MORE: CPT | Mod: LT

## 2025-04-30 PROCEDURE — 99214 OFFICE O/P EST MOD 30 MIN: CPT

## 2025-04-30 RX ORDER — COLCHICINE 0.6 MG/1
0.6 TABLET ORAL DAILY
Qty: 30 | Refills: 0 | Status: ACTIVE | COMMUNITY
Start: 2025-04-30 | End: 1900-01-01

## 2025-06-09 ENCOUNTER — RX RENEWAL (OUTPATIENT)
Age: 65
End: 2025-06-09